# Patient Record
Sex: FEMALE | Race: WHITE | NOT HISPANIC OR LATINO | Employment: UNEMPLOYED | URBAN - METROPOLITAN AREA
[De-identification: names, ages, dates, MRNs, and addresses within clinical notes are randomized per-mention and may not be internally consistent; named-entity substitution may affect disease eponyms.]

---

## 2023-05-16 ENCOUNTER — TELEPHONE (OUTPATIENT)
Dept: PRIMARY CARE | Facility: CLINIC | Age: 33
End: 2023-05-16

## 2023-05-16 DIAGNOSIS — T75.3XXA MOTION SICKNESS, INITIAL ENCOUNTER: Primary | ICD-10-CM

## 2023-05-16 NOTE — TELEPHONE ENCOUNTER
Pt is going to [place] and going [place] sea fishing and she was wondering if there is something she can get for being on a boat. She leaves Sunday morning and uses [employer] [place].

## 2023-05-17 RX ORDER — SCOLOPAMINE TRANSDERMAL SYSTEM 1 MG/1
PATCH, EXTENDED RELEASE TRANSDERMAL
Qty: 2 PATCH | Refills: 0 | Status: SHIPPED | OUTPATIENT
Start: 2023-05-17 | End: 2023-06-26

## 2023-05-17 NOTE — TELEPHONE ENCOUNTER
Rx sent.  Place patch behind ear day before doing fishing and keep on for 3 days then you can remove it.    I am assuming the fishing is a one day thing?

## 2023-06-26 ENCOUNTER — OFFICE VISIT (OUTPATIENT)
Dept: PRIMARY CARE | Facility: CLINIC | Age: 33
End: 2023-06-26
Payer: COMMERCIAL

## 2023-06-26 VITALS
BODY MASS INDEX: 36.28 KG/M2 | DIASTOLIC BLOOD PRESSURE: 66 MMHG | WEIGHT: 218 LBS | SYSTOLIC BLOOD PRESSURE: 107 MMHG | TEMPERATURE: 97.3 F | HEART RATE: 57 BPM

## 2023-06-26 DIAGNOSIS — L23.7 POISON IVY DERMATITIS: Primary | ICD-10-CM

## 2023-06-26 PROCEDURE — 1036F TOBACCO NON-USER: CPT | Performed by: FAMILY MEDICINE

## 2023-06-26 PROCEDURE — 99213 OFFICE O/P EST LOW 20 MIN: CPT | Performed by: FAMILY MEDICINE

## 2023-06-26 RX ORDER — SERTRALINE HYDROCHLORIDE 100 MG/1
100 TABLET, FILM COATED ORAL DAILY
COMMUNITY

## 2023-06-26 RX ORDER — TRIAMCINOLONE ACETONIDE 1 MG/G
CREAM TOPICAL 2 TIMES DAILY
Qty: 30 G | Refills: 0 | Status: SHIPPED | OUTPATIENT
Start: 2023-06-26 | End: 2023-07-28 | Stop reason: ALTCHOICE

## 2023-06-26 RX ORDER — METHYLPREDNISOLONE 4 MG/1
TABLET ORAL
Qty: 21 TABLET | Refills: 0 | Status: SHIPPED | OUTPATIENT
Start: 2023-06-26 | End: 2023-07-03

## 2023-06-26 ASSESSMENT — PATIENT HEALTH QUESTIONNAIRE - PHQ9
2. FEELING DOWN, DEPRESSED OR HOPELESS: NOT AT ALL
SUM OF ALL RESPONSES TO PHQ9 QUESTIONS 1 AND 2: 0
1. LITTLE INTEREST OR PLEASURE IN DOING THINGS: NOT AT ALL

## 2023-06-26 NOTE — PROGRESS NOTES
Subjective   Patient ID: Mary Carmen Jaeger is a 33 y.o. female who presents for Poison Ivy (Pt has poison ivy since last night,).  Yesterday working in yard came in to contact with poison ivy   Today hands hurt and itch and red rash     Poison Ivy  This is a new problem. The current episode started in the past 7 days. The problem is unchanged. The affected locations include the left hand and right hand. The rash is characterized by blistering, itchiness, dryness and redness. She was exposed to plant contact. Pertinent negatives include no anorexia, congestion, cough, diarrhea, eye pain, facial edema, fatigue, fever, joint pain, nail changes, rhinorrhea, shortness of breath, sore throat or vomiting. Past treatments include anti-itch cream. The treatment provided no relief. There is no history of allergies, asthma or varicella.       Review of Systems   Constitutional:  Negative for fatigue and fever.   HENT:  Negative for congestion, rhinorrhea and sore throat.    Eyes:  Negative for pain.   Respiratory:  Negative for cough and shortness of breath.    Gastrointestinal:  Negative for anorexia, diarrhea and vomiting.   Musculoskeletal:  Negative for joint pain.   Skin:  Negative for nail changes.     Constitutional: no chills, no fever and no night sweats.   Eyes: no blurred vision and no eyesight problems.   ENT: no hearing loss, no nasal congestion, no nasal discharge, no hoarseness and no sore throat.   Cardiovascular: no chest pain, no intermittent leg claudication, no lower extremity edema, no palpitations and no syncope.   Respiratory: no cough, no shortness of breath during exertion, no shortness of breath at rest and no wheezing.   Gastrointestinal: no abdominal pain, no blood in stools, no constipation, no diarrhea, no melena, no nausea, no rectal pain and no vomiting.   Genitourinary: no dysuria, no change in urinary frequency, no urinary hesitancy, no feelings of urinary urgency and no vaginal discharge.    Musculoskeletal: no arthralgias,  no back pain and no myalgias.   Integumentary: no new skin lesions and no rashes.   Neurological: no difficulty walking, no headache, no limb weakness, no numbness and no tingling.   Psychiatric: no anxiety, no depression, no anhedonia and no substance use disorders.   Endocrine: no recent weight gain and no recent weight loss.   Hematologic/Lymphatic: no tendency for easy bruising and no swollen glands .    Objective    /66   Pulse 57   Temp 36.3 °C (97.3 °F)   Wt 98.9 kg (218 lb)   BMI 36.28 kg/m²    Physical Exam  The patient appeared well nourished and normally developed. Vital signs as documented. Head exam is unremarkable. No scleral icterus or corneal arcus noted.  Pupils are equal round reactive to light extraocular movements are intact no hemorrhages noted on funduscopic exam mouth mucous membranes are moist no exudates ears canals clear TMs are gray pearly not injected nose no rhinorrhea or epistaxis Neck is without jugular venous distension, thyromegaly, or carotid bruits. Carotid upstrokes are brisk bilaterally. Lungs are clear to auscultation and percussion. Cardiac exam reveals the PMI to be normally sized and situated. Rhythm is regular. First and second heart sounds normal. No murmurs, rubs or gallops. Abdominal exam reveals normal bowel sounds, no masses, no organomegaly and no aortic enlargement. Extremities are nonedematous and both femoral and pedal pulses are normal.  Neurologic exam DTRs are equal bilaterally no focal deficits strength is symmetrical heme lymph no palpable lymph nodes in the neck axilla or groin    Assessment/Plan   Problem List Items Addressed This Visit       Poison ivy dermatitis - Primary    Relevant Medications    methylPREDNISolone (Medrol Dospak) 4 mg tablets    triamcinolone (Kenalog) 0.1 % cream            Saumya Lauren MD

## 2023-06-30 PROBLEM — L23.7 POISON IVY DERMATITIS: Status: ACTIVE | Noted: 2023-06-30

## 2023-06-30 ASSESSMENT — ENCOUNTER SYMPTOMS
COUGH: 0
EYE PAIN: 0
NAIL CHANGES: 0
DIARRHEA: 0
FEVER: 0
ANOREXIA: 0
SORE THROAT: 0
VOMITING: 0
SHORTNESS OF BREATH: 0
FATIGUE: 0
RHINORRHEA: 0

## 2023-07-28 PROBLEM — Z67.20 TYPE B BLOOD, RH POSITIVE: Status: ACTIVE | Noted: 2023-07-28

## 2023-07-28 PROBLEM — M54.9 CHRONIC BACK PAIN: Status: ACTIVE | Noted: 2023-07-28

## 2023-07-28 PROBLEM — M47.816 FACET ARTHROPATHY, LUMBAR: Status: ACTIVE | Noted: 2023-07-28

## 2023-07-28 PROBLEM — F41.9 ANXIETY: Status: ACTIVE | Noted: 2023-07-28

## 2023-07-28 PROBLEM — M41.9 SCOLIOSIS: Status: ACTIVE | Noted: 2023-07-28

## 2023-07-28 PROBLEM — M48.02 FORAMINAL STENOSIS OF CERVICAL REGION: Status: ACTIVE | Noted: 2023-07-28

## 2023-07-28 PROBLEM — F41.1 GAD (GENERALIZED ANXIETY DISORDER): Status: ACTIVE | Noted: 2023-07-28

## 2023-07-28 PROBLEM — M47.812 FACET ARTHROPATHY, CERVICAL: Status: ACTIVE | Noted: 2023-07-28

## 2023-07-28 PROBLEM — L23.7 POISON IVY DERMATITIS: Status: RESOLVED | Noted: 2023-06-30 | Resolved: 2023-07-28

## 2023-07-28 PROBLEM — J45.990 EXERCISE-INDUCED ASTHMA (HHS-HCC): Status: ACTIVE | Noted: 2023-07-28

## 2023-07-28 PROBLEM — Z86.711 HISTORY OF PULMONARY EMBOLISM: Status: ACTIVE | Noted: 2023-07-28

## 2023-07-28 PROBLEM — G89.29 CHRONIC BACK PAIN: Status: ACTIVE | Noted: 2023-07-28

## 2023-07-28 PROBLEM — F41.0 PANIC ATTACKS: Status: ACTIVE | Noted: 2023-07-28

## 2023-07-28 RX ORDER — ALBUTEROL SULFATE 90 UG/1
2 AEROSOL, METERED RESPIRATORY (INHALATION) EVERY 6 HOURS PRN
COMMUNITY

## 2023-07-28 RX ORDER — MULTIVITAMIN
1 TABLET ORAL DAILY
COMMUNITY
End: 2024-01-29 | Stop reason: ALTCHOICE

## 2023-07-28 RX ORDER — FLUTICASONE PROPIONATE AND SALMETEROL 250; 50 UG/1; UG/1
1 POWDER RESPIRATORY (INHALATION)
COMMUNITY
End: 2023-07-31 | Stop reason: ALTCHOICE

## 2023-07-28 ASSESSMENT — ENCOUNTER SYMPTOMS
VOMITING: 0
SORE THROAT: 0
EYE PAIN: 0
POLYPHAGIA: 0
HEMATURIA: 0
HALLUCINATIONS: 0
FREQUENCY: 0
CHILLS: 0
NECK PAIN: 0
DYSURIA: 0
HEADACHES: 0
EYE REDNESS: 0
CONFUSION: 0
BACK PAIN: 0
APPETITE CHANGE: 0
ABDOMINAL PAIN: 0
BRUISES/BLEEDS EASILY: 0
PALPITATIONS: 0
EYE DISCHARGE: 0
TROUBLE SWALLOWING: 0
FATIGUE: 0
ADENOPATHY: 0
FEVER: 0
UNEXPECTED WEIGHT CHANGE: 0
BLOOD IN STOOL: 0
POLYDIPSIA: 0
SHORTNESS OF BREATH: 0
WOUND: 0
COUGH: 0
DIZZINESS: 0

## 2023-07-28 NOTE — PROGRESS NOTES
Subjective   Patient ID: Mary Carmen Jaeger is a 33 y.o. female who presents for Annual Exam (Pt is here for annual exam, would like to know if you can start sertraline /Is pt fasting? no/Does pt see any providers other than care team below: provider list up to date./Mendoza, nnamdi, claude, sofiya/ /Last albuterol inh use; end of June /Using wixela bid consistently? Not using anymore /Which grandma had rectal cancer (maternal or paternal)? Maternal /Phq9, gad7/).  Is pt fasting? no  Does pt see any providers other than care team below: no  Mendoza, nnamdi, claude, sofiya    Last albuterol inh use end of june  Using wixela bid consistently? no  Which grandma had rectal cancer (maternal or paternal)?mat  Phq9=2, gad7=3    No care team member to display    HPI  Last labs-4/2022 cbc nl, bmp neg  Due for labs- all    Cholesterol   Date Value Ref Range Status   06/25/2018 150 0 - 199 mg/dL Final     Comment:     .      AGE      DESIRABLE   BORDERLINE HIGH   HIGH     0-19 Y     0 - 169       170 - 199     >/= 200    20-24 Y     0 - 189       190 - 224     >/= 225         >24 Y     0 - 199       200 - 239     >/= 240   **All ranges are based on fasting samples. Specific   therapeutic targets will vary based on patient-specific   cardiac risk.  .   Pediatric guidelines reference:Pediatrics 2011, 128(S5).   Adult guidelines reference: NCEP ATPIII Guidelines,     URIEL 2001, 258:2486-97  .   Venipuncture immediately after or during the    administration of Metamizole may lead to falsely   low results. Testing should be performed immediately   prior to Metamizole dosing.       Triglycerides   Date Value Ref Range Status   06/25/2018 49 0 - 149 mg/dL Final     Comment:     .      AGE      DESIRABLE   BORDERLINE HIGH   HIGH     VERY HIGH   0 D-90 D    19 - 174         ----         ----        ----  91 D- 9 Y     0 -  74        75 -  99     >/= 100      ----    10-19 Y     0 -  89        90 - 129     >/= 130      ----    20-24 Y      "0 - 114       115 - 149     >/= 150      ----         >24 Y     0 - 149       150 - 199    200- 499    >/= 500  .   Venipuncture immediately after or during the    administration of Metamizole may lead to falsely   low results. Testing should be performed immediately   prior to Metamizole dosing.       HDL   Date Value Ref Range Status   06/25/2018 49.5 mg/dL Final     Comment:     .      AGE      VERY LOW   LOW     NORMAL    HIGH       0-19 Y       < 35   < 40     40-45     ----    20-24 Y       ----   < 40       >45     ----      >24 Y       ----   < 40     40-60      >60  .       No results found for: \"LDL\"  TSH   Date Value Ref Range Status   06/25/2018 1.37 0.44 - 3.98 mIU/L Final     Comment:      TSH testing is performed using different testing    methodology at [facility] than at other    [facility] hospitals. Direct result comparisons should    only be made within the same method.  .   Patients receiving more than 5 mg/day of biotin may have interference   in test results.  A sample should be taken no sooner than eight hours   after  previous dose. Contact [phone] for additional information.       No results found for: \"A1C\"  No components found for: \"POCA1C\"  No results found for: \"ALBUR\"  No components found for: \"POCALBUR\"      Other concerns: wants ddimer checked; had PE due to bcp    Lf thigh with dog bite 1wk ago; just bruised now. No fever or chills, no redness around the area. No open area    bps at home- none    ER/urgicare visits in the last year- rt ankle-July 3-torn ligaments-just switched into ankle brace-still painful. Will be going to PT  Hospitalizations in the last year- none      last Pap- 11/2019, due 11/2024  H/o abn pap-none    FH ovarian, endometrial, cervical, uterine ca-none    Current birth control method-none;  had vasectomy  No change in contraception desired    FH br ca-none    FH colon ca-mgm-rectal    Started emaglutide with b12 injection x 2wks-[facility] [place]    Exercise- none right now due to ankle tear  Diet-lower carbs, decr portions   Body mass index is 35.94 kg/m².    last dental appt- less than 6mon    BMs-regular  Sleep-able to fall asleep and stay asleep; no snoring or apnea  no cp, swelling, sob, abd pain, n/v/d/c, blood in stool or black stools  STI testing including hiv (age 15-65) and hep c screening (18-79)-declines        Immunization History   Administered Date(s) Administered    Pfizer Purple Cap SARS-CoV-2 03/23/2021, 04/15/2021, 12/03/2021    Tdap vaccine, age 10 years and older (BOOSTRIX) 05/15/2019       fractures in lifetime-rt ankle age 13, toes  H/o scoliosis and had back surgery-rods      FH heart attack, heart surgery-none  FH stroke-none    The ASCVD Risk score (Frank KAUR, et al., 2019) failed to calculate for the following reasons:    The 2019 ASCVD risk score is only valid for ages 40 to 79        Review of Systems   Constitutional:  Negative for appetite change, chills, fatigue, fever and unexpected weight change.   HENT:  Negative for congestion, ear pain, sore throat and trouble swallowing.    Eyes:  Negative for pain, discharge and redness.   Respiratory:  Negative for cough and shortness of breath.    Cardiovascular:  Negative for chest pain and palpitations.   Gastrointestinal:  Negative for abdominal pain, blood in stool and vomiting.   Endocrine: Negative for polydipsia, polyphagia and polyuria.   Genitourinary:  Negative for dysuria, frequency, hematuria and urgency.   Musculoskeletal:  Negative for back pain and neck pain.   Skin:  Negative for rash and wound.   Allergic/Immunologic: Negative for immunocompromised state.   Neurological:  Negative for dizziness, syncope and headaches.   Hematological:  Negative for adenopathy. Does not bruise/bleed easily.   Psychiatric/Behavioral:  Negative for confusion and hallucinations.        Objective   Visit Vitals  /67   Pulse 78   Temp 36.6 °C (97.8 °F)       BP Readings from Last 3 Encounters:   07/31/23 100/67   06/26/23 107/66   01/16/23 116/70     Wt Readings from Last 3 Encounters:   07/31/23 98 kg (216 lb)   06/26/23 98.9 kg (218 lb)   01/16/23 96.6 kg (213 lb)           Physical Exam  Constitutional:       General: She is not in acute distress.     Appearance: Normal appearance. She is not ill-appearing.   HENT:      Head: Normocephalic.      Right Ear: Tympanic membrane, ear canal and external ear normal.      Left Ear: Tympanic membrane, ear canal and external ear normal.      Nose: Nose normal.      Mouth/Throat:      Mouth: Mucous membranes are moist.      Pharynx: Oropharynx is clear.   Eyes:      Extraocular Movements: Extraocular movements intact.      Conjunctiva/sclera: Conjunctivae normal.      Pupils: Pupils are equal, round, and reactive to light.   Cardiovascular:      Rate and Rhythm: Normal rate and regular rhythm.      Heart sounds: Normal heart sounds. No murmur heard.  Pulmonary:      Effort: Pulmonary effort is normal. No respiratory distress.      Breath sounds: Normal breath sounds. No wheezing, rhonchi or rales.   Abdominal:      General: Bowel sounds are normal.      Palpations: Abdomen is soft. There is no mass.      Tenderness: There is no abdominal tenderness.   Musculoskeletal:         General: No swelling or tenderness. Normal range of motion.      Cervical back: Normal range of motion and neck supple.      Right lower leg: No edema.      Left lower leg: No edema.   Skin:     General: Skin is warm.      Findings: No rash.   Neurological:      General: No focal deficit present.      Mental Status: She is alert and oriented to person, place, and time.      Cranial Nerves: No cranial nerve deficit.      Motor: No weakness.   Psychiatric:         Mood and Affect: Mood normal.         Behavior: Behavior normal.         Assessment/Plan   Diagnoses and all orders for this visit:  Routine general medical examination at a health care  facility  -     Comprehensive Metabolic Panel; Future  -     CBC and Auto Differential; Future  -     Lipid Panel; Future  -     TSH with reflex to Free T4 if abnormal; Future  BMI 35.0-35.9,adult  History of pulmonary embolism  -     D-dimer, quantitative; Future  Other orders  -     Follow Up In Primary Care - Established; Future

## 2023-07-31 ENCOUNTER — OFFICE VISIT (OUTPATIENT)
Dept: PRIMARY CARE | Facility: CLINIC | Age: 33
End: 2023-07-31
Payer: COMMERCIAL

## 2023-07-31 VITALS
TEMPERATURE: 97.8 F | HEART RATE: 78 BPM | OXYGEN SATURATION: 95 % | BODY MASS INDEX: 35.99 KG/M2 | SYSTOLIC BLOOD PRESSURE: 100 MMHG | DIASTOLIC BLOOD PRESSURE: 67 MMHG | WEIGHT: 216 LBS | HEIGHT: 65 IN

## 2023-07-31 DIAGNOSIS — Z00.00 ROUTINE GENERAL MEDICAL EXAMINATION AT A HEALTH CARE FACILITY: Primary | ICD-10-CM

## 2023-07-31 DIAGNOSIS — Z86.711 HISTORY OF PULMONARY EMBOLISM: ICD-10-CM

## 2023-07-31 PROCEDURE — 3008F BODY MASS INDEX DOCD: CPT | Performed by: NURSE PRACTITIONER

## 2023-07-31 PROCEDURE — 1036F TOBACCO NON-USER: CPT | Performed by: NURSE PRACTITIONER

## 2023-07-31 PROCEDURE — 99395 PREV VISIT EST AGE 18-39: CPT | Performed by: NURSE PRACTITIONER

## 2023-07-31 ASSESSMENT — ANXIETY QUESTIONNAIRES
3. WORRYING TOO MUCH ABOUT DIFFERENT THINGS: NOT AT ALL
5. BEING SO RESTLESS THAT IT IS HARD TO SIT STILL: NOT AT ALL
6. BECOMING EASILY ANNOYED OR IRRITABLE: SEVERAL DAYS
7. FEELING AFRAID AS IF SOMETHING AWFUL MIGHT HAPPEN: NOT AT ALL
IF YOU CHECKED OFF ANY PROBLEMS ON THIS QUESTIONNAIRE, HOW DIFFICULT HAVE THESE PROBLEMS MADE IT FOR YOU TO DO YOUR WORK, TAKE CARE OF THINGS AT HOME, OR GET ALONG WITH OTHER PEOPLE: SOMEWHAT DIFFICULT
1. FEELING NERVOUS, ANXIOUS, OR ON EDGE: SEVERAL DAYS
GAD7 TOTAL SCORE: 3
2. NOT BEING ABLE TO STOP OR CONTROL WORRYING: NOT AT ALL
4. TROUBLE RELAXING: SEVERAL DAYS

## 2023-07-31 ASSESSMENT — PATIENT HEALTH QUESTIONNAIRE - PHQ9
1. LITTLE INTEREST OR PLEASURE IN DOING THINGS: NOT AT ALL
8. MOVING OR SPEAKING SO SLOWLY THAT OTHER PEOPLE COULD HAVE NOTICED. OR THE OPPOSITE, BEING SO FIGETY OR RESTLESS THAT YOU HAVE BEEN MOVING AROUND A LOT MORE THAN USUAL: NOT AT ALL
2. FEELING DOWN, DEPRESSED OR HOPELESS: NOT AT ALL
3. TROUBLE FALLING OR STAYING ASLEEP OR SLEEPING TOO MUCH: SEVERAL DAYS
2. FEELING DOWN, DEPRESSED OR HOPELESS: NOT AT ALL
SUM OF ALL RESPONSES TO PHQ QUESTIONS 1-9: 2
9. THOUGHTS THAT YOU WOULD BE BETTER OFF DEAD, OR OF HURTING YOURSELF: NOT AT ALL
SUM OF ALL RESPONSES TO PHQ9 QUESTIONS 1 AND 2: 0
1. LITTLE INTEREST OR PLEASURE IN DOING THINGS: NOT AT ALL
7. TROUBLE CONCENTRATING ON THINGS, SUCH AS READING THE NEWSPAPER OR WATCHING TELEVISION: NOT AT ALL
10. IF YOU CHECKED OFF ANY PROBLEMS, HOW DIFFICULT HAVE THESE PROBLEMS MADE IT FOR YOU TO DO YOUR WORK, TAKE CARE OF THINGS AT HOME, OR GET ALONG WITH OTHER PEOPLE: NOT DIFFICULT AT ALL
6. FEELING BAD ABOUT YOURSELF - OR THAT YOU ARE A FAILURE OR HAVE LET YOURSELF OR YOUR FAMILY DOWN: SEVERAL DAYS
SUM OF ALL RESPONSES TO PHQ9 QUESTIONS 1 AND 2: 0
5. POOR APPETITE OR OVEREATING: NOT AT ALL
4. FEELING TIRED OR HAVING LITTLE ENERGY: NOT AT ALL

## 2023-07-31 NOTE — PATIENT INSTRUCTIONS
Handouts given to pt:  physical handout        I recommend signing up for MyChart.    Labs:    You can use the lab in our building when fasting. The hrs are: Monday-Thursday, 7 a.m. - 6 p.m., Friday, 7 a.m. - 5 p.m., Saturday 8 a.m. - 12 noon.   No appt needed, BUT YOU DO NEED THE PAPER ORDER.    Fasting is no food, drink, gum or mints other than water for 12 hrs.   Results will be back in 2-3 business days for most labs. It is always recommended for any orders (labs, xrays, ultrasounds,MRI, ct scan, procedures etc) to check with your insurance provider for expected costs or expenses to you.     You will get your results via phone from my medical assistant if you do not have MyChart.  OR  You will get your results via [employer]    If a result is urgent, I will call to speak to you.    Vaccines:  Up to date    General recommendations:  Exercise-cardio 4-5d/wk 30min each day  Diet-Breakfast-toast (my favorite Elva Osuna Delighful Multigrain or Akhil's Killer Bread Good Seed thin-sliced)/bagel/English muffin-whole wheat flour as a 1st ingredient or cereal/oatmeal/granola bar-fiber 4g or more or protein like eggs or peanut butter; optional veggies  Lunch-protein, 1/2c carb or 2 slices bread, veg 1c  Dinner-protein, fist sized carb, veg 1c  Fruit 2 a day  Dairy 2 a day-milk, soy milk, almond milk, cheese, yogurt, cottage cheese  Snacks-Protein-hard boiled egg, nuts (walnuts/almonds/pecans/pistachios 1/4c), hummus, beef/deer jerky or meat sticks; vegetable, fruit, dairy-milk(1%, skim, almond, soy)/cheese (not a lot of cheddar)/yogurt (Greek is best-my favorite Dannon Fruit on the Bottom Greek)/cottage cheese 2%; triscuits/ popcorn/wheat thins have a lot of fiber; follow serving size on bag/box/container  increase water  Limit alcohol to 1 drink per day for women and 2 drinks per day for men (1 drink=12oz beer or 5oz wine or 1 1/2oz liquor)  Calcium: 500mg 1 twice a day if age 50 and younger and 600mg 1 twice a day if over  age 50 (calcium citrate can be taken without food)  Vitamin D: 800-5000 IU/day  Limit salt to <2300mg a day if age 50 and under and <1500mg a day if over age 50/have high bp or diabetes or kidney disease  Recommend folate for childbearing age women 0.4mg per day (can be found in a multivitamin)  Recommend 18mg/dL of iron a day if age 50 and under and 8mg/dL a day if over age 50; take on an empty stomach at bedtime  Use sunscreen   Wear seatbelt  Recommend safe sex practices: using condoms everytime you have sex, discuss with a new partner about their past partners/history of STDs/drug use, avoid drinking alcohol or using drugs as this increases the chance that you will participate in high-risk sex, for oral sex help protect your mouth by having your partner use a condom (male or female), women should not douche after sex, be aware of your partner's body and your body-look for signs of a sore, blister, rash, or discharge, and have regular exams and periodic tests for STDs.  No distracted driving  No driving when under influence of substances  Wear a seatbelt  Eye dr every 1-2yrs  Dentist every 6-12 mon  Tetanus shot every 10yrs  Recommend flu vaccine in the fall  Appt in 6mon for follow up on diet exercise and anxiety and 1 year for physical      I will communicate with you via [employer] regarding messages and results. If you need help with this, you can call the support line at [phone].    IT WAS A PLEASURE TO SEE YOU TODAY. THANK YOU FOR CHOOSING US FOR YOUR HEALTHCARE NEEDS.

## 2023-10-14 RX ORDER — CLONIDINE HYDROCHLORIDE 0.1 MG/1
TABLET ORAL
COMMUNITY
Start: 2020-11-04 | End: 2024-01-29 | Stop reason: ALTCHOICE

## 2023-10-14 RX ORDER — BACLOFEN 20 MG/1
20 TABLET ORAL 3 TIMES DAILY
COMMUNITY
Start: 2020-11-03 | End: 2024-01-29 | Stop reason: ALTCHOICE

## 2023-10-14 RX ORDER — DIAZEPAM 5 MG/1
5 TABLET ORAL EVERY 6 HOURS
COMMUNITY
Start: 2020-07-24 | End: 2024-01-29 | Stop reason: ALTCHOICE

## 2023-10-14 RX ORDER — BACLOFEN 10 MG/1
TABLET ORAL
COMMUNITY
Start: 2020-02-06 | End: 2024-01-29 | Stop reason: ALTCHOICE

## 2023-10-14 RX ORDER — BACLOFEN 5 MG/1
15 TABLET ORAL DAILY
COMMUNITY
End: 2024-01-29 | Stop reason: ALTCHOICE

## 2023-10-14 RX ORDER — ONDANSETRON 4 MG/1
4 TABLET, ORALLY DISINTEGRATING ORAL EVERY 8 HOURS PRN
COMMUNITY
Start: 2023-01-23 | End: 2024-01-29 | Stop reason: ALTCHOICE

## 2023-10-14 RX ORDER — POLYETHYLENE GLYCOL 3350 17 G/17G
17 POWDER, FOR SOLUTION ORAL ONCE
COMMUNITY
Start: 2020-07-24 | End: 2024-01-29 | Stop reason: ALTCHOICE

## 2023-10-14 RX ORDER — OXYCODONE HYDROCHLORIDE 5 MG/1
1-2 TABLET ORAL EVERY 6 HOURS
COMMUNITY
Start: 2020-07-24 | End: 2024-01-29 | Stop reason: ALTCHOICE

## 2023-10-14 RX ORDER — PREDNISONE 20 MG/1
20 TABLET ORAL DAILY
COMMUNITY
Start: 2023-01-26 | End: 2024-01-29 | Stop reason: ALTCHOICE

## 2023-10-14 RX ORDER — DOXYCYCLINE HYCLATE 100 MG
TABLET ORAL
COMMUNITY
Start: 2018-08-28 | End: 2024-01-27 | Stop reason: ALTCHOICE

## 2023-10-14 RX ORDER — FLUTICASONE PROPIONATE AND SALMETEROL 250; 50 UG/1; UG/1
1 POWDER RESPIRATORY (INHALATION)
COMMUNITY
Start: 2023-01-26

## 2023-10-14 RX ORDER — ONDANSETRON 4 MG/1
4 TABLET, FILM COATED ORAL EVERY 8 HOURS
COMMUNITY
Start: 2020-07-24 | End: 2024-01-29 | Stop reason: ALTCHOICE

## 2023-10-14 RX ORDER — NAPROXEN 500 MG/1
500 TABLET ORAL 2 TIMES DAILY
COMMUNITY
Start: 2020-07-24 | End: 2024-01-29 | Stop reason: ALTCHOICE

## 2023-10-14 RX ORDER — IBUPROFEN 600 MG/1
600 TABLET ORAL EVERY 6 HOURS PRN
COMMUNITY
Start: 2023-07-03 | End: 2024-01-29 | Stop reason: ALTCHOICE

## 2023-10-14 RX ORDER — ACETAMINOPHEN 500 MG
500 TABLET ORAL AS NEEDED
COMMUNITY

## 2023-10-14 RX ORDER — ACETAMINOPHEN 325 MG/1
650 TABLET ORAL EVERY 4 HOURS PRN
COMMUNITY
Start: 2021-06-30 | End: 2024-01-29 | Stop reason: ALTCHOICE

## 2023-10-14 RX ORDER — GABAPENTIN 600 MG/1
600 TABLET ORAL 3 TIMES DAILY
COMMUNITY
End: 2024-01-29 | Stop reason: ALTCHOICE

## 2023-10-14 RX ORDER — CYANOCOBALAMIN 1000 UG/ML
1000 INJECTION, SOLUTION INTRAMUSCULAR; SUBCUTANEOUS
COMMUNITY
Start: 2023-06-22 | End: 2024-01-29 | Stop reason: ALTCHOICE

## 2023-10-14 RX ORDER — FLUTICASONE PROPIONATE 50 MCG
1 SPRAY, SUSPENSION (ML) NASAL 2 TIMES DAILY
COMMUNITY
Start: 2021-10-25 | End: 2024-01-29 | Stop reason: ALTCHOICE

## 2023-10-15 PROBLEM — M50.123 CERVICAL DISC DISORDER AT C6-C7 LEVEL WITH RADICULOPATHY: Status: ACTIVE | Noted: 2023-10-15

## 2023-10-15 PROBLEM — J45.20 MILD INTERMITTENT ASTHMA WITHOUT COMPLICATION (HHS-HCC): Status: ACTIVE | Noted: 2023-10-15

## 2023-10-15 PROBLEM — L72.3 SEBACEOUS CYST: Status: ACTIVE | Noted: 2018-09-11

## 2023-10-15 PROBLEM — M25.373 ANKLE INSTABILITY: Status: ACTIVE | Noted: 2023-10-15

## 2023-10-15 PROBLEM — Z98.891 STATUS POST CESAREAN SECTION: Status: ACTIVE | Noted: 2023-10-15

## 2023-10-15 PROBLEM — S99.919A ANKLE INJURY: Status: ACTIVE | Noted: 2023-10-15

## 2023-10-15 PROBLEM — J40 BRONCHITIS: Status: ACTIVE | Noted: 2023-10-15

## 2023-10-15 PROBLEM — J32.1 CHRONIC FRONTAL SINUSITIS: Status: ACTIVE | Noted: 2023-10-15

## 2023-10-17 ENCOUNTER — OFFICE VISIT (OUTPATIENT)
Dept: ORTHOPEDIC SURGERY | Facility: CLINIC | Age: 33
End: 2023-10-17
Payer: COMMERCIAL

## 2023-10-17 DIAGNOSIS — S93.401A MODERATE RIGHT ANKLE SPRAIN, INITIAL ENCOUNTER: Primary | ICD-10-CM

## 2023-10-17 PROCEDURE — 99212 OFFICE O/P EST SF 10 MIN: CPT | Performed by: INTERNAL MEDICINE

## 2023-10-17 PROCEDURE — 3008F BODY MASS INDEX DOCD: CPT | Performed by: INTERNAL MEDICINE

## 2023-10-17 PROCEDURE — 1036F TOBACCO NON-USER: CPT | Performed by: INTERNAL MEDICINE

## 2023-10-17 ASSESSMENT — PATIENT HEALTH QUESTIONNAIRE - PHQ9
SUM OF ALL RESPONSES TO PHQ9 QUESTIONS 1 AND 2: 0
1. LITTLE INTEREST OR PLEASURE IN DOING THINGS: NOT AT ALL
2. FEELING DOWN, DEPRESSED OR HOPELESS: NOT AT ALL

## 2023-10-17 NOTE — PROGRESS NOTES
CC:   Chief Complaint   Patient presents with    Right Ankle - Follow-up, Pain      FUV- Right ankle sprain, grade 2-3.  DOI- 7/3/23   more pain and it is starting to radiate with some swelling   S/P- PT       HPI: Mary Carmen is a 33 y.o. female follow-up for right ankle injury.  She is doing some pain and discomfort and instability.  She has been a fracture boot and lace up ankle brace.  She is done physical therapy.  Anti-inflammatories.  Continues to have pain and discomfort with no improvement.        Review of Systems   GENERAL: Negative for malaise, significant weight loss, fever  MUSCULOSKELETAL: See HPI  NEURO:  Negative for numbness / tingling     Past Medical History  Past Medical History:   Diagnosis Date    COVID-19 07/2022    Other conditions influencing health status 08/29/2019    No significant past medical history    Personal history of other complications of pregnancy, childbirth and the puerperium     History of pre-eclampsia    Personal history of other diseases of the respiratory system 03/21/2022    History of acute bacterial sinusitis    Personal history of pulmonary embolism 04/08/2022    History of pulmonary embolism    Unspecified asthma, uncomplicated     Mild asthma without complication, unspecified whether persistent       Medication review  Medication Documentation Review Audit       Reviewed by Kiana Zimmerman MA (Medical Assistant) on 10/17/23 at 1302      Medication Order Taking? Sig Documenting Provider Last Dose Status   acetaminophen (Tylenol) 325 mg tablet [id number]  Take 2 tablets (650 mg) by mouth every 4 hours if needed. Historical Provider, MD  Active   acetaminophen (Tylenol) 500 mg tablet [id number]  Take 1 tablet (500 mg) by mouth if needed. Historical Provider, MD  Active   albuterol 90 mcg/actuation inhaler [id number] No Inhale 2 puffs every 6 hours if needed for wheezing. Historical Provider, MD Taking Active   apixaban (Eliquis) 5 mg tablet [id number]  Take 1 tablet (5 mg)  by mouth 2 times a day. Douglas Rollins MD  Active   baclofen (Lioresal) 10 mg tablet [id number]   Douglas Rollins MD  Active   baclofen (Lioresal) 20 mg tablet [id number]  Take 1 tablet (20 mg) by mouth 3 times a day. Douglas Rollins MD  Active   baclofen (Lioresal) 5 mg tablet [id number]  Take 3 tablets (15 mg) by mouth once daily. Douglas Rollins MD  Active   buspirone HCl (BUSPIRONE ORAL) [id number]  Take 10 mg by mouth once daily. Douglas Rollins MD  Active   cloNIDine (Catapres) 0.1 mg tablet [id number]  Take by mouth. Douglas Rollins MD  Active   cyanocobalamin (Vitamin B-12) 1,000 mcg/mL injection [id number]  Inject 1 mL (1,000 mcg) into the muscle 1 (one) time per week. Douglas Rollins MD  Active   diazePAM (Valium) 5 mg tablet [id number]  Take 1 tablet (5 mg) by mouth every 6 hours. Douglas Rollins MD  Active   doxycycline (Vibra-Tabs) 100 mg tablet [id number]  1 Tablet Historical MD Ria  Active   fluticasone (Flonase) 50 mcg/actuation nasal spray [id number]  Administer 1 spray into each nostril 2 times a day. Douglas Rollins MD  Active   fluticasone propion-salmeteroL (Wixela Inhub) 250-50 mcg/dose diskus inhaler [id number]  Inhale 1 puff 2 times a day. Douglas Rollins MD  Active   gabapentin (Neurontin) 600 mg tablet [id number]  Take 1 tablet (600 mg) by mouth 3 times a day. Douglas Rollins MD  Active    mg tablet [id number]  Take 1 tablet (600 mg) by mouth every 6 hours if needed. Douglas Rollins MD  Active   multivitamin tablet [id number] No Take 1 tablet by mouth once daily. Douglas Rollins MD Taking Active   multivitamin with minerals (MULTIPLE VITAMIN-MINERALS ORAL) [id number]  Take by mouth. Douglas Rollins MD  Active   naproxen (Naprosyn) 500 mg tablet [id number]  Take 1 tablet (500 mg) by mouth twice a day. Douglas Rollins MD  Active   ondansetron (Zofran) 4 mg tablet [id number]  Take 1 tablet (4 mg) by mouth every 8 hours.  Historical Provider, MD  Active   ondansetron ODT (Zofran-ODT) 4 mg disintegrating tablet [id number]  Take 1 tablet (4 mg) by mouth every 8 hours if needed. Historical Provider, MD  Active   oxyCODONE (Roxicodone) 5 mg immediate release tablet [id number]  Take 1-2 tablets (5-10 mg) by mouth every 6 hours. Historical Provider, MD  Active   polyethylene glycol (Miralax) 17 gram/dose powder [id number]  Take 17 g by mouth 1 time. Historical Provider, MD  Active   predniSONE (Deltasone) 20 mg tablet [id number]  Take 1 tablet (20 mg) by mouth once daily. Historical Provider, MD  Active   semaglutide 0.25 mg or 0.5 mg (2 mg/3 mL) pen injector [id number]  Inject 0.5 mg under the skin every 7 days. Historical Provider, MD  Active   sertraline (Zoloft) 100 mg tablet [id number] No Take 1 tablet (100 mg) by mouth once daily. Historical Provider, MD Taking Active                    Allergies  Allergies   Allergen Reactions    Codeine Anaphylaxis, Diarrhea, Nausea/vomiting, Nausea Only and Shortness of breath       Social History  Social History     Socioeconomic History    Marital status:      Spouse name: Not on file    Number of children: Not on file    Years of education: Not on file    Highest education level: Not on file   Occupational History    Not on file   Tobacco Use    Smoking status: Never    Smokeless tobacco: Never   Substance and Sexual Activity    Alcohol use: Yes    Drug use: Never    Sexual activity: Not on file   Other Topics Concern    Not on file   Social History Narrative    Not on file     Social Determinants of Health     Financial Resource Strain: Not on file   Food Insecurity: Not on file   Transportation Needs: Not on file   Physical Activity: Not on file   Stress: Not on file   Social Connections: Not on file   Intimate Partner Violence: Not on file   Housing Stability: Not on file       Surgical History  Past Surgical History:   Procedure Laterality Date    KNEE ARTHROSCOPY W/ DEBRIDEMENT  08/29/2019     Arthroscopy Knee Right    KNEE ARTHROSCOPY W/ DEBRIDEMENT  2019    Arthroscopy Knee Left    OTHER SURGICAL HISTORY  2022    Corneal lasik    OTHER SURGICAL HISTORY  2022    Back surgery    OTHER SURGICAL HISTORY  2022     section    OTHER SURGICAL HISTORY  2022    Dilation and curettage       Physical Exam:  GENERAL:  Patient is awake, alert, and oriented to person place and time.  Patient appears well nourished and well kept.  Affect Calm, Not Acutely Distressed.  HEENT:  Normocephalic, Atraumatic, EOMI  CARDIOVASCULAR:  Hemodynamically stable.  RESPIRATORY:  Normal respirations with unlabored breathing.  Extremity: Right foot ankle shows skin is intact.  No obvious deformity.  There is no pain of the lateral or medial malleolus.  Some mild pain over the deltoid ligament.  Some mild pain over the PTF ligament.  Minimal pain of the ATF ligament.  Negative Carnes's test.  Negative anterior drawer test.  No pain of the talar dome.  She is neurovascular intact.      Diagnostics: Trays reviewed  XR ankle  Interpreted By:  MORGAN ENGLISH MD  MRN: [id number]  Patient Name: MARY CARMEN GUERRIER     STUDY:  ANKLE, COMPLETE, MIN 3 VIEWS;  Right;  2023 1:44 pm     INDICATION:  pain  S99.919A: Ankle injury.     ACCESSION NUMBER(S):  [id number]     ORDERING CLINICIAN:  MORGAN ENGLISH     FINDINGS:  Right ankle x-rays three views AP, lateral and oblique view: No acute  fractures, no dislocation. Mortise intact.         Procedure: None    Assessment: 1.  Right ankle sprain  2.  Right ankle instability    Plan: Mary Carmen presents for follow-up for right ankle sprain and ankle instability.  She has failed all conservative treatment options with immobilization, fracture boot, lace up ankle brace and physical therapy.  We will request for authorization for MRI of the right ankle to evaluate for ankle instability.  She will follow-up after the MRI and discuss treatment options pending MRI  results.    No orders of the defined types were placed in this encounter.     At the conclusion of the visit there were no further questions by the patient/family regarding their plan of care.  Patient was instructed to call or return with any issues, questions, or concerns regarding their injury and/or treatment plan described above.     10/17/23 at 1:11 PM - Clotilde Mendoza MD    Office: [phone]    This note was prepared using voice recognition software.  The details of this note are correct and have been reviewed, and corrected to the best of my ability.  Some grammatical errors may persist related to the Dragon software.

## 2023-10-26 ENCOUNTER — TELEPHONE (OUTPATIENT)
Dept: PRIMARY CARE | Facility: CLINIC | Age: 33
End: 2023-10-26
Payer: COMMERCIAL

## 2023-11-01 ENCOUNTER — HOSPITAL ENCOUNTER (OUTPATIENT)
Dept: RADIOLOGY | Facility: HOSPITAL | Age: 33
Discharge: HOME | End: 2023-11-01
Payer: COMMERCIAL

## 2023-11-01 DIAGNOSIS — S93.401A MODERATE RIGHT ANKLE SPRAIN, INITIAL ENCOUNTER: ICD-10-CM

## 2023-11-01 PROCEDURE — 73721 MRI JNT OF LWR EXTRE W/O DYE: CPT | Mod: RIGHT SIDE | Performed by: STUDENT IN AN ORGANIZED HEALTH CARE EDUCATION/TRAINING PROGRAM

## 2023-11-01 PROCEDURE — 73721 MRI JNT OF LWR EXTRE W/O DYE: CPT | Mod: RT

## 2023-11-07 ENCOUNTER — OFFICE VISIT (OUTPATIENT)
Dept: ORTHOPEDIC SURGERY | Facility: CLINIC | Age: 33
End: 2023-11-07
Payer: COMMERCIAL

## 2023-11-07 DIAGNOSIS — S93.401A MODERATE RIGHT ANKLE SPRAIN, INITIAL ENCOUNTER: Primary | ICD-10-CM

## 2023-11-07 PROCEDURE — 99213 OFFICE O/P EST LOW 20 MIN: CPT | Performed by: INTERNAL MEDICINE

## 2023-11-07 PROCEDURE — 1036F TOBACCO NON-USER: CPT | Performed by: INTERNAL MEDICINE

## 2023-11-07 PROCEDURE — 3008F BODY MASS INDEX DOCD: CPT | Performed by: INTERNAL MEDICINE

## 2023-11-07 ASSESSMENT — PAIN - FUNCTIONAL ASSESSMENT: PAIN_FUNCTIONAL_ASSESSMENT: 0-10

## 2023-11-07 ASSESSMENT — PAIN SCALES - GENERAL
PAINLEVEL_OUTOF10: 2
PAINLEVEL_OUTOF10: 2

## 2023-11-07 NOTE — PROGRESS NOTES
CC:   Chief Complaint   Patient presents with    Right Ankle - Follow-up     MRI Review  Sprain and instability   DOI:7/3/23       HPI: Mary Carmen is a 33 y.o. female presented for an MRI of the right ankle, and making slow progress to having some pain.  She almost now about 4 months now.  She is here for MRI follow-up.        Review of Systems   GENERAL: Negative for malaise, significant weight loss, fever  MUSCULOSKELETAL: See HPI  NEURO:  Negative for numbness / tingling     Past Medical History  Past Medical History:   Diagnosis Date    COVID-19 07/2022    Other conditions influencing health status 08/29/2019    No significant past medical history    Personal history of other complications of pregnancy, childbirth and the puerperium     History of pre-eclampsia    Personal history of other diseases of the respiratory system 03/21/2022    History of acute bacterial sinusitis    Personal history of pulmonary embolism 04/08/2022    History of pulmonary embolism    Unspecified asthma, uncomplicated     Mild asthma without complication, unspecified whether persistent       Medication review  Medication Documentation Review Audit       Reviewed by Kiana Zimmerman MA (Medical Assistant) on 10/17/23 at 1302      Medication Order Taking? Sig Documenting Provider Last Dose Status   acetaminophen (Tylenol) 325 mg tablet [id number]  Take 2 tablets (650 mg) by mouth every 4 hours if needed. Historical Provider, MD  Active   acetaminophen (Tylenol) 500 mg tablet [id number]  Take 1 tablet (500 mg) by mouth if needed. Historical Provider, MD  Active   albuterol 90 mcg/actuation inhaler [id number] No Inhale 2 puffs every 6 hours if needed for wheezing. Historical Provider, MD Taking Active   apixaban (Eliquis) 5 mg tablet [id number]  Take 1 tablet (5 mg) by mouth 2 times a day. Historical Provider, MD  Active   baclofen (Lioresal) 10 mg tablet [id number]   Historical Provider, MD  Active   baclofen (Lioresal) 20 mg tablet [id number]   Take 1 tablet (20 mg) by mouth 3 times a day. Douglas Rollins MD  Active   baclofen (Lioresal) 5 mg tablet [id number]  Take 3 tablets (15 mg) by mouth once daily. Douglas Rollins MD  Active   buspirone HCl (BUSPIRONE ORAL) [id number]  Take 10 mg by mouth once daily. Douglas Rollins MD  Active   cloNIDine (Catapres) 0.1 mg tablet [id number]  Take by mouth. Douglas Rollins MD  Active   cyanocobalamin (Vitamin B-12) 1,000 mcg/mL injection [id number]  Inject 1 mL (1,000 mcg) into the muscle 1 (one) time per week. Douglas Rollins MD  Active   diazePAM (Valium) 5 mg tablet [id number]  Take 1 tablet (5 mg) by mouth every 6 hours. Douglas Rollins MD  Active   doxycycline (Vibra-Tabs) 100 mg tablet [id number]  1 Tablet Historical MD Ria  Active   fluticasone (Flonase) 50 mcg/actuation nasal spray [id number]  Administer 1 spray into each nostril 2 times a day. Douglas Rollins MD  Active   fluticasone propion-salmeteroL (Wixela Inhub) 250-50 mcg/dose diskus inhaler [id number]  Inhale 1 puff 2 times a day. Douglas Rollins MD  Active   gabapentin (Neurontin) 600 mg tablet [id number]  Take 1 tablet (600 mg) by mouth 3 times a day. Douglas Rollins MD  Active    mg tablet [id number]  Take 1 tablet (600 mg) by mouth every 6 hours if needed. Douglas Rollins MD  Active   multivitamin tablet [id number] No Take 1 tablet by mouth once daily. Douglas Rollins MD Taking Active   multivitamin with minerals (MULTIPLE VITAMIN-MINERALS ORAL) [id number]  Take by mouth. Douglas Rollins MD  Active   naproxen (Naprosyn) 500 mg tablet [id number]  Take 1 tablet (500 mg) by mouth twice a day. Douglas Rollins MD  Active   ondansetron (Zofran) 4 mg tablet [id number]  Take 1 tablet (4 mg) by mouth every 8 hours. Douglas Rollins MD  Active   ondansetron ODT (Zofran-ODT) 4 mg disintegrating tablet [id number]  Take 1 tablet (4 mg) by mouth every 8 hours if needed. Douglas Rollins MD   Active   oxyCODONE (Roxicodone) 5 mg immediate release tablet [id number]  Take 1-2 tablets (5-10 mg) by mouth every 6 hours. Historical Provider, MD  Active   polyethylene glycol (Miralax) 17 gram/dose powder [id number]  Take 17 g by mouth 1 time. Historical Provider, MD  Active   predniSONE (Deltasone) 20 mg tablet [id number]  Take 1 tablet (20 mg) by mouth once daily. Historical Provider, MD  Active   semaglutide 0.25 mg or 0.5 mg (2 mg/3 mL) pen injector [id number]  Inject 0.5 mg under the skin every 7 days. Historical Provider, MD  Active   sertraline (Zoloft) 100 mg tablet [id number] No Take 1 tablet (100 mg) by mouth once daily. Historical Provider, MD Taking Active                    Allergies  Allergies   Allergen Reactions    Codeine Anaphylaxis, Diarrhea, Nausea/vomiting, Nausea Only and Shortness of breath       Social History  Social History     Socioeconomic History    Marital status:      Spouse name: Not on file    Number of children: Not on file    Years of education: Not on file    Highest education level: Not on file   Occupational History    Not on file   Tobacco Use    Smoking status: Never    Smokeless tobacco: Never   Substance and Sexual Activity    Alcohol use: Yes    Drug use: Never    Sexual activity: Not on file   Other Topics Concern    Not on file   Social History Narrative    Not on file     Social Determinants of Health     Financial Resource Strain: Not on file   Food Insecurity: Not on file   Transportation Needs: Not on file   Physical Activity: Not on file   Stress: Not on file   Social Connections: Not on file   Intimate Partner Violence: Not on file   Housing Stability: Not on file       Surgical History  Past Surgical History:   Procedure Laterality Date    KNEE ARTHROSCOPY W/ DEBRIDEMENT  08/29/2019    Arthroscopy Knee Right    KNEE ARTHROSCOPY W/ DEBRIDEMENT  08/29/2019    Arthroscopy Knee Left    OTHER SURGICAL HISTORY  07/18/2022    Corneal lasik    OTHER SURGICAL HISTORY   2022    Back surgery    OTHER SURGICAL HISTORY  2022     section    OTHER SURGICAL HISTORY  2022    Dilation and curettage       Physical Exam:  GENERAL:  Patient is awake, alert, and oriented to person place and time.  Patient appears well nourished and well kept.  Affect Calm, Not Acutely Distressed.  HEENT:  Normocephalic, Atraumatic, EOMI  CARDIOVASCULAR:  Hemodynamically stable.  RESPIRATORY:  Normal respirations with unlabored breathing.  Extremity: Right foot and ankle shows skin is intact.  No obvious swelling deformity.  There is no pain over the lateral malleolus, minimal pain over the medial malleus.  Minimal pain with ATF ligament there is no pain at the CF or PTF ligament.  Minimal pain over the deltoid ligament.  Negative Carnes's test.  She is neurovascular intact.      Diagnostics: Reviewed  MR ankle right wo IV contrast  Narrative: Interpreted By:  Brayan Vogel,   STUDY:  MRI of the  right ankle without IV contrast;  2023 2:00 pm      INDICATION:  Signs/Symptoms:pain.      COMPARISON:  2023      ACCESSION NUMBER(S):  [id number]      ORDERING CLINICIAN:  MORGAN ENGLISH      TECHNIQUE:  MR imaging of the  right ankle was obtained  without administration  of intravenous contrast medium.      FINDINGS:  TENDONS:  The extensor tendons are intact and demonstrate a normal course. The  flexor tendons are intact and demonstrate a normal course. The  peroneal tendons are intact and demonstrate a normal course. The  Achilles tendon is intact. The plantar aponeurosis is intact.      LIGAMENTS:  There is mild hyperintensity of the deltoid ligament without  discontinuity suggesting a low-grade sprain. The anterior talofibular  ligament is indistinct and diminutive suggesting a moderate grade  sprain. Similar appearance of the posterior talofibular ligament. The  calcaneofibular ligament is intact. The anterior and posterior  tibiofibular ligaments are intact. The spring  ligament is intact.      JOINTS:  There is no dislocation. There is no joint effusion. The articular  cartilage of the ankle joint is normal. There is no osteochondral  defect in the talar dome.      OSSEOUS STRUCTURES:  There is marrow edema of the medial malleolus without a fracture  line.. There is no fracture or contusion. There is no marrow  replacing lesion.      SOFT TISSUES:  There is no muscle atrophy or tear.  The tarsal tunnel is normal.  The sinus tarsi is normal with preservation of fat signal.      Impression: Findings consistent with moderate grade low ankle sprain including  moderate grade sprain of the anterior talofibular ligament and  posterior talofibular ligament. Low-grade sprain of the deltoid  ligament. There is marrow edema of the medial malleolus suggesting  osseous contusion.      MACRO:  None      Signed by: Brayan Vogel 11/1/2023 6:01 PM  Dictation workstation:   [id number]      Procedure: None    Assessment:  1.  Right ankle sprain  2.  Right ankle instability    Plan: Mary Carmen presented for an MRI follow-up, which moderate ankle sprain with some bone bruising of the medial malleolus.  She is making slow progress.  Continue with a home PT program, activity modification.  Ankle brace as needed.  MRI were discussed in detail the patient, there is no significant structural damage, no indication for any surgical intervention.  At this point she is doing well we will have her follow-up as needed.    No orders of the defined types were placed in this encounter.     At the conclusion of the visit there were no further questions by the patient/family regarding their plan of care.  Patient was instructed to call or return with any issues, questions, or concerns regarding their injury and/or treatment plan described above.     11/07/23 at 10:49 AM - Clotilde Mendoza MD    Office: [phone]    This note was prepared using voice recognition software.  The details of this note are correct and  have been reviewed, and corrected to the best of my ability.  Some grammatical errors may persist related to the Dragon software.

## 2024-01-27 PROBLEM — J40 BRONCHITIS: Status: RESOLVED | Noted: 2023-10-15 | Resolved: 2024-01-27

## 2024-01-27 PROBLEM — F41.9 ANXIETY: Status: RESOLVED | Noted: 2023-07-28 | Resolved: 2024-01-27

## 2024-01-27 ASSESSMENT — ENCOUNTER SYMPTOMS
WHEEZING: 0
SHORTNESS OF BREATH: 0
CONSTITUTIONAL NEGATIVE: 1

## 2024-01-27 NOTE — PROGRESS NOTES
Subjective   Patient ID: Mary Carmen Jaeger is a 33 y.o. female who presents for Follow-up.  Last physical:7/31/23    Is pt fasting? No   Did pt do fasting labs from July? No   BPs at home (put an avg of the numbers)- not doing   Last use of albuterol inh- 1 month   Using wixela bid consistently?  No, only for colds.   Which baclofen is she on? Has not used in months   Which acetaminophen is she on? 500mg as needed OTC   Which zofran is she on? No   Any other questions or concerns that pt wants to discuss today? No     Pt has a psychiatrist      HPI  Last labs-4/2022 cbc nl, bmp neg   Due for labs- will do at     Cholesterol   Date Value Ref Range Status   06/25/2018 150 0 - 199 mg/dL Final     Comment:     .      AGE      DESIRABLE   BORDERLINE HIGH   HIGH     0-19 Y     0 - 169       170 - 199     >/= 200    20-24 Y     0 - 189       190 - 224     >/= 225         >24 Y     0 - 199       200 - 239     >/= 240   **All ranges are based on fasting samples. Specific   therapeutic targets will vary based on patient-specific   cardiac risk.  .   Pediatric guidelines reference:Pediatrics 2011, 128(S5).   Adult guidelines reference: NCEP ATPIII Guidelines,     URIEL 2001, 258:2486-97  .   Venipuncture immediately after or during the    administration of Metamizole may lead to falsely   low results. Testing should be performed immediately   prior to Metamizole dosing.       Triglycerides   Date Value Ref Range Status   06/25/2018 49 0 - 149 mg/dL Final     Comment:     .      AGE      DESIRABLE   BORDERLINE HIGH   HIGH     VERY HIGH   0 D-90 D    19 - 174         ----         ----        ----  91 D- 9 Y     0 -  74        75 -  99     >/= 100      ----    10-19 Y     0 -  89        90 - 129     >/= 130      ----    20-24 Y     0 - 114       115 - 149     >/= 150      ----         >24 Y     0 - 149       150 - 199    200- 499    >/= 500  .   Venipuncture immediately after or during the    administration of Metamizole may lead  "to falsely   low results. Testing should be performed immediately   prior to Metamizole dosing.       HDL   Date Value Ref Range Status   2018 49.5 mg/dL Final     Comment:     .      AGE      VERY LOW   LOW     NORMAL    HIGH       0-19 Y       < 35   < 40     40-45     ----    20-24 Y       ----   < 40       >45     ----      >24 Y       ----   < 40     40-60      >60  .       No results found for: \"LDL\"  TSH   Date Value Ref Range Status   2018 1.37 0.44 - 3.98 mIU/L Final     Comment:      TSH testing is performed using different testing    methodology at [facility] than at other    [facility] hospitals. Direct result comparisons should    only be made within the same method.  .   Patients receiving more than 5 mg/day of biotin may have interference   in test results.  A sample should be taken no sooner than eight hours   after  previous dose. Contact [phone] for additional information.       No results found for: \"A1C\"  No components found for: \"POCA1C\"  No results found for: \"ALBUR\"  No components found for: \"POCALBUR\"    Mat gm- age 49 of rectal ca  Having issues with hemorrhoids since pregnancy  Friend-has br ca    Exercise-walking, has a peloton  Incr water  Lost 18lbs  Diet-breakfast 40oz water, diet dr pepper 1-2 a day; stopped tea since can't do fake sugar; nausea as usual in am; sometimes leftovers  Lunch-ham and swiss on white, water  No caffeine after 3p  Dinner-water, protein, starch, veggie  Snacks-sweets  Etoh-none    Building a house-done in july-will put peloton there    Immunization History   Administered Date(s) Administered    Influenza, injectable, quadrivalent 2020    Pfizer Purple Cap SARS-CoV-2 2021, 04/15/2021, 2021    Pneumococcal polysaccharide vaccine, 23-valent, age 2 years and older (PNEUMOVAX 23) 2020    Tdap vaccine, age 7 year and older (BOOSTRIX, ADACEL) 05/15/2019        No care team member to display     Review of Systems "   Constitutional: Negative.    Respiratory:  Negative for shortness of breath and wheezing.    Cardiovascular:  Negative for chest pain.       Objective   Visit Vitals  /73   Pulse 73   Temp 36.3 °C (97.3 °F)      BP Readings from Last 3 Encounters:   01/29/24 106/73   07/31/23 100/67   06/26/23 107/66     Wt Readings from Last 3 Encounters:   01/29/24 91.1 kg (200 lb 12.8 oz)   07/31/23 98 kg (216 lb)   06/26/23 98.9 kg (218 lb)       The ASCVD Risk score (Frank KAUR, et al., 2019) failed to calculate for the following reasons:    The 2019 ASCVD risk score is only valid for ages 40 to 79     Physical Exam  Constitutional:       General: She is not in acute distress.     Appearance: Normal appearance.   Neurological:      Mental Status: She is alert.         Assessment/Plan   Diagnoses and all orders for this visit:  BMI 33.0-33.9,adult  Hemorrhoids, unspecified hemorrhoid type  -     Referral to Gastroenterology; Future  Other orders  -     Follow Up In Primary Care - Health Maintenance; Future

## 2024-01-29 ENCOUNTER — OFFICE VISIT (OUTPATIENT)
Dept: PRIMARY CARE | Facility: CLINIC | Age: 34
End: 2024-01-29
Payer: COMMERCIAL

## 2024-01-29 VITALS
BODY MASS INDEX: 33.45 KG/M2 | HEART RATE: 73 BPM | TEMPERATURE: 97.3 F | SYSTOLIC BLOOD PRESSURE: 106 MMHG | WEIGHT: 200.8 LBS | OXYGEN SATURATION: 99 % | HEIGHT: 65 IN | DIASTOLIC BLOOD PRESSURE: 73 MMHG

## 2024-01-29 DIAGNOSIS — K64.9 HEMORRHOIDS, UNSPECIFIED HEMORRHOID TYPE: ICD-10-CM

## 2024-01-29 PROCEDURE — 1036F TOBACCO NON-USER: CPT | Performed by: NURSE PRACTITIONER

## 2024-01-29 PROCEDURE — 99214 OFFICE O/P EST MOD 30 MIN: CPT | Performed by: NURSE PRACTITIONER

## 2024-01-29 PROCEDURE — 3008F BODY MASS INDEX DOCD: CPT | Performed by: NURSE PRACTITIONER

## 2024-01-29 NOTE — PATIENT INSTRUCTIONS
You can use the lab in our building when fasting. The hrs are: Monday-Friday, 7 a.m. - 5 p.m., Saturday 8 a.m. - 12 noon.   No appt needed, BUT YOU DO NEED THE PAPER ORDER.    Fasting is no food, drink, gum or mints other than water for 8-12 hrs.   Results will be back in 2-3 business days for most labs. It is always recommended for any orders (labs, xrays, ultrasounds,MRI, ct scan, procedures etc) to check with your insurance provider for expected costs or expenses to you.     See zenon monson    Exercise-cardio 4-5d/wk 30min each day  Diet-Breakfast-toast (my favorite Elva Enrrique Delightful multi-grain and Akhil's Killer Bread thin-sliced Good Seed)/bagel/English muffin-whole wheat flour as a 1st ingredient or cereal/oatmeal/granola bar-fiber 4g or more; protein like eggs or peanut butter is Ok  Lunch-protein, veg 1c  Dinner-protein, veg 1c; if having potatoes, rice, noodles, or pasta-->fist sized; could try brown rice or whole wheat pasta or quinoa  Fruit 2 a day  Dairy 2 a day-milk, almond milk, soy milk, yogurt, cottage cheese, yogurt  Snacks-Protein-hard boiled egg, nuts (walnuts/almonds/pecans/pistachios 1/4c), hummus, beef/deer jerky; vegetable, fruit, dairy-milk(1%, skim, almond, soy)/cheese (not a lot of cheddar)/yogurt (Greek is best-my favorite Dannon Fruit on the Bottom Greek)/cottage cheese 2%; triscuits, popcorn have a lot of fiber; serving size  Water  Limit alcohol to 2 drinks per day (1 drink=12oz beer or 5oz wine or 1 1/2oz liquor)  appt in  6  months; be fasting      I will communicate with you via [employer] regarding messages and results. If you need help with this, you can call the support line at [phone].    IT WAS A PLEASURE TO SEE YOU TODAY. THANK YOU FOR CHOOSING US FOR YOUR HEALTHCARE NEEDS.

## 2024-01-30 ENCOUNTER — LAB (OUTPATIENT)
Dept: LAB | Facility: LAB | Age: 34
End: 2024-01-30
Payer: COMMERCIAL

## 2024-01-30 DIAGNOSIS — Z86.711 PERSONAL HISTORY OF PULMONARY EMBOLISM: Primary | ICD-10-CM

## 2024-01-30 DIAGNOSIS — Z00.00 ENCOUNTER FOR GENERAL ADULT MEDICAL EXAMINATION WITHOUT ABNORMAL FINDINGS: ICD-10-CM

## 2024-01-30 LAB
ALBUMIN SERPL BCP-MCNC: 4.2 G/DL (ref 3.4–5)
ALP SERPL-CCNC: 57 U/L (ref 33–110)
ALT SERPL W P-5'-P-CCNC: 11 U/L (ref 7–45)
ANION GAP SERPL CALC-SCNC: 13 MMOL/L (ref 10–20)
AST SERPL W P-5'-P-CCNC: 11 U/L (ref 9–39)
BASOPHILS # BLD AUTO: 0.04 X10*3/UL (ref 0–0.1)
BASOPHILS NFR BLD AUTO: 0.8 %
BILIRUB SERPL-MCNC: 0.9 MG/DL (ref 0–1.2)
BUN SERPL-MCNC: 12 MG/DL (ref 6–23)
CALCIUM SERPL-MCNC: 9.9 MG/DL (ref 8.6–10.6)
CHLORIDE SERPL-SCNC: 106 MMOL/L (ref 98–107)
CHOLEST SERPL-MCNC: 153 MG/DL (ref 0–199)
CHOLESTEROL/HDL RATIO: 3.5
CO2 SERPL-SCNC: 26 MMOL/L (ref 21–32)
CREAT SERPL-MCNC: 0.69 MG/DL (ref 0.5–1.05)
D DIMER PPP FEU-MCNC: 216 NG/ML FEU
EGFRCR SERPLBLD CKD-EPI 2021: >90 ML/MIN/1.73M*2
EOSINOPHIL # BLD AUTO: 0.2 X10*3/UL (ref 0–0.7)
EOSINOPHIL NFR BLD AUTO: 3.9 %
ERYTHROCYTE [DISTWIDTH] IN BLOOD BY AUTOMATED COUNT: 12.4 % (ref 11.5–14.5)
GLUCOSE SERPL-MCNC: 96 MG/DL (ref 74–99)
HCT VFR BLD AUTO: 43.9 % (ref 36–46)
HDLC SERPL-MCNC: 43.4 MG/DL
HGB BLD-MCNC: 14.9 G/DL (ref 12–16)
IMM GRANULOCYTES # BLD AUTO: 0.01 X10*3/UL (ref 0–0.7)
IMM GRANULOCYTES NFR BLD AUTO: 0.2 % (ref 0–0.9)
LDLC SERPL CALC-MCNC: 96 MG/DL
LYMPHOCYTES # BLD AUTO: 1.17 X10*3/UL (ref 1.2–4.8)
LYMPHOCYTES NFR BLD AUTO: 22.9 %
MCH RBC QN AUTO: 28.8 PG (ref 26–34)
MCHC RBC AUTO-ENTMCNC: 33.9 G/DL (ref 32–36)
MCV RBC AUTO: 85 FL (ref 80–100)
MONOCYTES # BLD AUTO: 0.27 X10*3/UL (ref 0.1–1)
MONOCYTES NFR BLD AUTO: 5.3 %
NEUTROPHILS # BLD AUTO: 3.41 X10*3/UL (ref 1.2–7.7)
NEUTROPHILS NFR BLD AUTO: 66.9 %
NON HDL CHOLESTEROL: 110 MG/DL (ref 0–149)
NRBC BLD-RTO: 0 /100 WBCS (ref 0–0)
PLATELET # BLD AUTO: 284 X10*3/UL (ref 150–450)
POTASSIUM SERPL-SCNC: 4.6 MMOL/L (ref 3.5–5.3)
PROT SERPL-MCNC: 6.6 G/DL (ref 6.4–8.2)
RBC # BLD AUTO: 5.17 X10*6/UL (ref 4–5.2)
SODIUM SERPL-SCNC: 140 MMOL/L (ref 136–145)
TRIGL SERPL-MCNC: 67 MG/DL (ref 0–149)
TSH SERPL-ACNC: 1.21 MIU/L (ref 0.44–3.98)
VLDL: 13 MG/DL (ref 0–40)
WBC # BLD AUTO: 5.1 X10*3/UL (ref 4.4–11.3)

## 2024-01-30 PROCEDURE — 80061 LIPID PANEL: CPT

## 2024-01-30 PROCEDURE — 85025 COMPLETE CBC W/AUTO DIFF WBC: CPT

## 2024-01-30 PROCEDURE — 80053 COMPREHEN METABOLIC PANEL: CPT

## 2024-01-30 PROCEDURE — 36415 COLL VENOUS BLD VENIPUNCTURE: CPT

## 2024-01-30 PROCEDURE — 85379 FIBRIN DEGRADATION QUANT: CPT

## 2024-01-30 PROCEDURE — 84443 ASSAY THYROID STIM HORMONE: CPT

## 2024-07-17 ENCOUNTER — TELEPHONE (OUTPATIENT)
Dept: PRIMARY CARE | Facility: CLINIC | Age: 34
End: 2024-07-17
Payer: COMMERCIAL

## 2024-07-17 DIAGNOSIS — Z80.0 FAMILY HISTORY OF RECTAL CANCER: Primary | ICD-10-CM

## 2024-07-17 NOTE — TELEPHONE ENCOUNTER
Pt would like to get her Colonoscopy done. She has a family history of colon cancer. Her next wellness appt is 8/6/24.

## 2024-07-17 NOTE — TELEPHONE ENCOUNTER
I will put an order in for a gi dr so that pt can discuss this with them. Pt can call [phone] to schedule this.  Typically, we would recommend a colonoscopy sooner than age 45 if mom, dad, brother or sister had colon cancer earlier than age 55.  It looks like grandma had rectal cancer.

## 2024-08-05 PROBLEM — S99.919A ANKLE INJURY: Status: RESOLVED | Noted: 2023-10-15 | Resolved: 2024-08-05

## 2024-08-05 ASSESSMENT — ENCOUNTER SYMPTOMS
VOMITING: 0
FATIGUE: 0
TROUBLE SWALLOWING: 0
DIZZINESS: 0
EYE PAIN: 0
HEMATURIA: 0
HALLUCINATIONS: 0
PALPITATIONS: 0
SHORTNESS OF BREATH: 0
POLYDIPSIA: 0
COUGH: 0
BRUISES/BLEEDS EASILY: 0
CONFUSION: 0
FREQUENCY: 0
POLYPHAGIA: 0
BLOOD IN STOOL: 0
APPETITE CHANGE: 0
SORE THROAT: 0
CHILLS: 0
EYE REDNESS: 0
ABDOMINAL PAIN: 0
FEVER: 0
DYSURIA: 0
BACK PAIN: 0
UNEXPECTED WEIGHT CHANGE: 0
ADENOPATHY: 0
WOUND: 0
NECK PAIN: 0
HEADACHES: 0
EYE DISCHARGE: 0

## 2024-08-05 NOTE — PATIENT INSTRUCTIONS
Due in nov for next pap with dr arriaza    Consider trazodone  See therapist  Continue sertraline    Transderm-scop for motion sickness    Rizatriptan for ocular migraines  Zofran for nausea/vomiting      Handouts given to pt:  physical handout      Labs:    You can use the lab in our building when fasting. The hrs are: Monday-Friday, 7 a.m. - 5 p.m., Saturday 8 a.m. - 12 noon.   No appt needed, BUT YOU DO NEED THE PAPER ORDER.    Fasting is no food, drink, gum or mints other than water for 12 hrs.   Results will be back in 2-3 business days for most labs. It is always recommended for any orders (labs, xrays, ultrasounds,MRI, ct scan, procedures etc) to check with your insurance provider for expected costs or expenses to you.       You will get your results via phone from my medical assistant if you do not have MyChart.  OR  You will get your results via MyChart    If a result is urgent, I will call to speak to you.    Vaccines:  Up to date    General recommendations:  Exercise-cardio 4-5d/wk 30min each day  Diet-Breakfast-toast (my favorite Elva Osuna Delighful Multigrain or Akhil's Killer Bread Good Seed thin-sliced)/bagel/English muffin-whole wheat flour as a 1st ingredient or cereal/oatmeal/granola bar-fiber 4g or more or protein like eggs or peanut butter; optional veggies  Lunch-protein, 1/2c carb or 2 slices bread, veg 1c  Dinner-protein, fist sized carb, veg 1c  Fruit 2 a day  Dairy 2 a day-milk, soy milk, almond milk, cheese, yogurt, cottage cheese  Snacks-Protein-hard boiled egg, nuts (walnuts/almonds/pecans/pistachios 1/4c), hummus, beef/deer jerky or meat sticks; vegetable, fruit, dairy-milk(1%, skim, almond, soy)/cheese (not a lot of cheddar)/yogurt (Greek is best-my favorite Dannon Fruit on the Bottom Greek)/cottage cheese 2%; triscuits/ popcorn/wheat thins have a lot of fiber; follow serving size on bag/box/container  increase water  Limit alcohol to 1 drink per day for women and 2 drinks per day for men  (1 drink=12oz beer or 5oz wine or 1 1/2oz liquor)  Calcium: 500mg 1 twice a day if age 50 and younger and 600mg 1 twice a day if over age 50 (calcium citrate can be taken without food)  Vitamin D: 800-5000 IU/day  Limit salt to <2300mg a day if age 50 and under and <1500mg a day if over age 50/have high bp or diabetes or kidney disease  Recommend folate for childbearing age women 0.4mg per day (can be found in a multivitamin)  Recommend 18mg/dL of iron a day if age 50 and under and 8mg/dL a day if over age 50; take on an empty stomach at bedtime  Use sunscreen   Wear seatbelt  Recommend safe sex practices: using condoms everytime you have sex, discuss with a new partner about their past partners/history of STDs/drug use, avoid drinking alcohol or using drugs as this increases the chance that you will participate in high-risk sex, for oral sex help protect your mouth by having your partner use a condom (male or female), women should not douche after sex, be aware of your partner's body and your body-look for signs of a sore, blister, rash, or discharge, and have regular exams and periodic tests for STDs.  No distracted driving  No driving when under influence of substances  Wear a seatbelt  Eye dr every 1-2yrs  Dentist every 6-12 mon  Tetanus shot every 10yrs  Recommend flu vaccine in the fall  Appt in 3mon for follow up on diet exercise anxiety and 1 year for physical      I will communicate with you via [employer] regarding messages and results. If you need help with this, you can call the support line at [phone].    IT WAS A PLEASURE TO SEE YOU TODAY. THANK YOU FOR CHOOSING US FOR YOUR HEALTHCARE NEEDS.

## 2024-08-05 NOTE — PROGRESS NOTES
Subjective   Patient ID: Mary Carmen Jaeger is a 34 y.o. female who presents for Annual Exam.    Is pt fasting? Yes   Does pt see any providers other than care team below:   nnamdi Mendoza [facility], [facility]  Has appt with gi  in sept    Last albuterol inh- has been 6 months   Any questions or concerns? Would like d-dimer checked due to history of PE,, cortisol levels  Phq9=2  , gad7=6    No care team member to display    HPI  Last labs-1/2024   CBC (complete blood count) was normal which looks at infection and anemia markers.  TSH (thyroid test) was normal.  Cholesterol:  Triglycerides, the carrier of the cholesterol, are normal  HDL, the good cholesterol, is low at 43.  Goal >40 for men and >50 for women. Increase cardio exercise. This is the type of exercise that raises your heartrate.  LDL, the bad cholesterol, is normal.  Sugar (aka glucose), kidney function, liver function and electrolytes in the CMP (comprehensive metabolic panel) were normal.  Ddimer, which looks at do you have a clot, is normal.  4/2022 cbc nl, bmp neg  Due for labs- none    Cholesterol   Date Value Ref Range Status   01/30/2024 153 0 - 199 mg/dL Final     Comment:           Age      Desirable   Borderline High   High     0-19 Y     0 - 169       170 - 199     >/= 200    20-24 Y     0 - 189       190 - 224     >/= 225         >24 Y     0 - 199       200 - 239     >/= 240   **All ranges are based on fasting samples. Specific   therapeutic targets will vary based on patient-specific   cardiac risk.    Pediatric guidelines reference:Pediatrics 2011, 128(S5).Adult guidelines reference: NCEP ATPIII Guidelines,URIEL 2001, 258:2486-97    Venipuncture immediately after or during the administration of Metamizole may lead to falsely low results. Testing should be performed immediately prior to Metamizole dosing.   06/25/2018 150 0 - 199 mg/dL Final     Comment:     .      AGE      DESIRABLE   BORDERLINE HIGH   HIGH     0-19 Y     0 - 169       170 - 199      >/= 200    20-24 Y     0 - 189       190 - 224     >/= 225         >24 Y     0 - 199       200 - 239     >/= 240   **All ranges are based on fasting samples. Specific   therapeutic targets will vary based on patient-specific   cardiac risk.  .   Pediatric guidelines reference:Pediatrics 2011, 128(S5).   Adult guidelines reference: NCEP ATPIII Guidelines,     URIEL 2001, 258:2486-97  .   Venipuncture immediately after or during the    administration of Metamizole may lead to falsely   low results. Testing should be performed immediately   prior to Metamizole dosing.       Triglycerides   Date Value Ref Range Status   01/30/2024 67 0 - 149 mg/dL Final     Comment:        Age         Desirable   Borderline High   High     Very High   0 D-90 D    19 - 174         ----         ----        ----  91 D- 9 Y     0 -  74        75 -  99     >/= 100      ----    10-19 Y     0 -  89        90 - 129     >/= 130      ----    20-24 Y     0 - 114       115 - 149     >/= 150      ----         >24 Y     0 - 149       150 - 199    200- 499    >/= 500    Venipuncture immediately after or during the administration of Metamizole may lead to falsely low results. Testing should be performed immediately prior to Metamizole dosing.   06/25/2018 49 0 - 149 mg/dL Final     Comment:     .      AGE      DESIRABLE   BORDERLINE HIGH   HIGH     VERY HIGH   0 D-90 D    19 - 174         ----         ----        ----  91 D- 9 Y     0 -  74        75 -  99     >/= 100      ----    10-19 Y     0 -  89        90 - 129     >/= 130      ----    20-24 Y     0 - 114       115 - 149     >/= 150      ----         >24 Y     0 - 149       150 - 199    200- 499    >/= 500  .   Venipuncture immediately after or during the    administration of Metamizole may lead to falsely   low results. Testing should be performed immediately   prior to Metamizole dosing.       HDL-Cholesterol   Date Value Ref Range Status   01/30/2024 43.4 mg/dL Final     Comment:       Age     "   Very Low   Low     Normal    High    0-19 Y    < 35      < 40     40-45     ----  20-24 Y    ----     < 40      >45      ----        >24 Y      ----     < 40     40-60      >60       HDL   Date Value Ref Range Status   06/25/2018 49.5 mg/dL Final     Comment:     .      AGE      VERY LOW   LOW     NORMAL    HIGH       0-19 Y       < 35   < 40     40-45     ----    20-24 Y       ----   < 40       >45     ----      >24 Y       ----   < 40     40-60      >60  .       No results found for: \"LDL\"  Thyroid Stimulating Hormone   Date Value Ref Range Status   01/30/2024 1.21 0.44 - 3.98 mIU/L Final     No results found for: \"A1C\"  No components found for: \"POCA1C\"  No results found for: \"ALBUR\"  No components found for: \"POCALBUR\"      Other concerns: Would like d-dimer checked due to history of PE,, cortisol levels    Unpacking house still  Decision fatigue  Wt gain, eye twitching    Going to [place] soon-motion sickness with roller coasters    Ocular migraines with aura; vomiting        bps at home- none    ER/urgicare visits in the last year- none  Hospitalizations in the last year- none      last Pap- 11/2019, due 11/2024  H/o abn pap-none    FH ovarian, endometrial, cervical, uterine ca-none    Current birth control method-none;  had vasectomy  No change in contraception desired    FH br ca-none    FH colon ca-mgm-rectal  Seeing 8/2024    Pancreatic ca-pgf    On semaglutide with b12 injection x 2wks-[facility] in [place]    Exercise- walk 1mi at bedtime x 5wks-dog walk; sometimes twice a day; peloton not set up yet-in box in basement  5wks ago moved in. Only been in house x 3wks due to vacays  Diet-lower carbs, decr portions before moved  Body mass index is 32.08 kg/m².    last dental appt- q6mon    BMs-regular  Sleep-hard to fall asleep and stay asleep; no snoring or apnea  no cp, swelling, sob, abd pain, n/v/d/c, blood in stool or black stools  STI testing including hiv (age 15-65) and hep c " screening (18-79)-declines        Immunization History   Administered Date(s) Administered    Influenza, injectable, quadrivalent 01/20/2020    Pfizer Purple Cap SARS-CoV-2 03/23/2021, 04/15/2021, 12/03/2021    Pneumococcal polysaccharide vaccine, 23-valent, age 2 years and older (PNEUMOVAX 23) 08/13/2020    Tdap vaccine, age 7 year and older (BOOSTRIX, ADACEL) 05/15/2019       fractures in lifetime-rt ankle age 13, toes  H/o osteoporosis-?pgm-on LT steroids      FH heart attack, heart surgery-none  FH stroke-none    The ASCVD Risk score (Frank DK, et al., 2019) failed to calculate for the following reasons:    The 2019 ASCVD risk score is only valid for ages 40 to 79        Review of Systems   Constitutional:  Negative for appetite change, chills, fatigue, fever and unexpected weight change.   HENT:  Negative for congestion, ear pain, sore throat and trouble swallowing.    Eyes:  Negative for pain, discharge and redness.   Respiratory:  Negative for cough and shortness of breath.    Cardiovascular:  Negative for chest pain and palpitations.   Gastrointestinal:  Negative for abdominal pain, blood in stool and vomiting.   Endocrine: Negative for polydipsia, polyphagia and polyuria.   Genitourinary:  Negative for dysuria, frequency, hematuria and urgency.   Musculoskeletal:  Negative for back pain and neck pain.   Skin:  Negative for rash and wound.   Allergic/Immunologic: Negative for immunocompromised state.   Neurological:  Negative for dizziness, syncope and headaches.   Hematological:  Negative for adenopathy. Does not bruise/bleed easily.   Psychiatric/Behavioral:  Negative for confusion and hallucinations.        Objective   Visit Vitals  /69   Pulse 85   Temp 36.1 °C (96.9 °F)        BP Readings from Last 3 Encounters:   08/06/24 100/69   01/29/24 106/73   07/31/23 100/67     Wt Readings from Last 3 Encounters:   08/06/24 87.5 kg (192 lb 12.8 oz)   01/29/24 91.1 kg (200 lb 12.8 oz)   07/31/23 98 kg (216  lb)           Physical Exam  Constitutional:       General: She is not in acute distress.     Appearance: Normal appearance. She is not ill-appearing.   HENT:      Head: Normocephalic.      Right Ear: Tympanic membrane, ear canal and external ear normal.      Left Ear: Tympanic membrane, ear canal and external ear normal.      Nose: Nose normal.      Mouth/Throat:      Mouth: Mucous membranes are moist.      Pharynx: Oropharynx is clear.   Eyes:      Extraocular Movements: Extraocular movements intact.      Conjunctiva/sclera: Conjunctivae normal.      Pupils: Pupils are equal, round, and reactive to light.   Cardiovascular:      Rate and Rhythm: Normal rate and regular rhythm.      Heart sounds: Normal heart sounds. No murmur heard.  Pulmonary:      Effort: Pulmonary effort is normal. No respiratory distress.      Breath sounds: Normal breath sounds. No wheezing, rhonchi or rales.   Abdominal:      General: Bowel sounds are normal.      Palpations: Abdomen is soft. There is no mass.      Tenderness: There is no abdominal tenderness.   Musculoskeletal:         General: No swelling or tenderness. Normal range of motion.      Cervical back: Normal range of motion and neck supple.      Right lower leg: No edema.      Left lower leg: No edema.   Skin:     General: Skin is warm.      Findings: No rash.   Neurological:      General: No focal deficit present.      Mental Status: She is alert and oriented to person, place, and time.      Cranial Nerves: No cranial nerve deficit.      Motor: No weakness.   Psychiatric:         Mood and Affect: Mood normal.         Behavior: Behavior normal.         Assessment/Plan   Diagnoses and all orders for this visit:  Routine general medical examination at a health care facility  MANUEL (generalized anxiety disorder)  -     Referral to Psychology; Future  History of pulmonary embolism  Weight gain  -     Cortisol; Future  BMI 32.0-32.9,adult  Ocular migraine  -     rizatriptan MLT  (Maxalt-MLT) 10 mg disintegrating tablet; Take 1 tablet (10 mg) by mouth 1 time if needed for migraine. May repeat in 2 hours if unresolved. Do not exceed 20 mg in 24 hours.  Nausea and vomiting, unspecified vomiting type  -     ondansetron (Zofran) 4 mg tablet; Take 1 tablet (4 mg) by mouth every 8 hours if needed for nausea or vomiting.  Motion sickness, initial encounter  -     scopolamine (Transderm-Scop) 1 mg over 3 days patch 3 day; Place 1 patch over 72 hours on the skin every 3rd day if needed (nausea).  Other orders  -     Follow Up In Primary Care - Health Maintenance

## 2024-08-06 ENCOUNTER — APPOINTMENT (OUTPATIENT)
Dept: PRIMARY CARE | Facility: CLINIC | Age: 34
End: 2024-08-06
Payer: COMMERCIAL

## 2024-08-06 VITALS
TEMPERATURE: 96.9 F | OXYGEN SATURATION: 96 % | WEIGHT: 192.8 LBS | SYSTOLIC BLOOD PRESSURE: 100 MMHG | HEART RATE: 85 BPM | HEIGHT: 65 IN | DIASTOLIC BLOOD PRESSURE: 69 MMHG | BODY MASS INDEX: 32.12 KG/M2

## 2024-08-06 DIAGNOSIS — R11.2 NAUSEA AND VOMITING, UNSPECIFIED VOMITING TYPE: ICD-10-CM

## 2024-08-06 DIAGNOSIS — T75.3XXA MOTION SICKNESS, INITIAL ENCOUNTER: ICD-10-CM

## 2024-08-06 DIAGNOSIS — G43.109 OCULAR MIGRAINE: ICD-10-CM

## 2024-08-06 DIAGNOSIS — Z00.00 ROUTINE GENERAL MEDICAL EXAMINATION AT A HEALTH CARE FACILITY: Primary | ICD-10-CM

## 2024-08-06 DIAGNOSIS — Z86.711 HISTORY OF PULMONARY EMBOLISM: ICD-10-CM

## 2024-08-06 DIAGNOSIS — R63.5 WEIGHT GAIN: ICD-10-CM

## 2024-08-06 DIAGNOSIS — F41.1 GAD (GENERALIZED ANXIETY DISORDER): ICD-10-CM

## 2024-08-06 PROCEDURE — 99395 PREV VISIT EST AGE 18-39: CPT | Performed by: NURSE PRACTITIONER

## 2024-08-06 PROCEDURE — 1036F TOBACCO NON-USER: CPT | Performed by: NURSE PRACTITIONER

## 2024-08-06 PROCEDURE — 99213 OFFICE O/P EST LOW 20 MIN: CPT | Performed by: NURSE PRACTITIONER

## 2024-08-06 PROCEDURE — 3008F BODY MASS INDEX DOCD: CPT | Performed by: NURSE PRACTITIONER

## 2024-08-06 RX ORDER — ONDANSETRON 4 MG/1
4 TABLET, FILM COATED ORAL EVERY 8 HOURS PRN
Qty: 90 TABLET | Refills: 1 | Status: SHIPPED | OUTPATIENT
Start: 2024-08-06 | End: 2024-10-05

## 2024-08-06 RX ORDER — SCOLOPAMINE TRANSDERMAL SYSTEM 1 MG/1
1 PATCH, EXTENDED RELEASE TRANSDERMAL
Qty: 7 PATCH | Refills: 0 | Status: SHIPPED | OUTPATIENT
Start: 2024-08-06 | End: 2024-10-05

## 2024-08-06 RX ORDER — RIZATRIPTAN BENZOATE 10 MG/1
10 TABLET, ORALLY DISINTEGRATING ORAL ONCE AS NEEDED
Qty: 12 TABLET | Refills: 5 | Status: SHIPPED | OUTPATIENT
Start: 2024-08-06 | End: 2024-10-17

## 2024-08-06 ASSESSMENT — PATIENT HEALTH QUESTIONNAIRE - PHQ9
8. MOVING OR SPEAKING SO SLOWLY THAT OTHER PEOPLE COULD HAVE NOTICED. OR THE OPPOSITE, BEING SO FIGETY OR RESTLESS THAT YOU HAVE BEEN MOVING AROUND A LOT MORE THAN USUAL: NOT AT ALL
3. TROUBLE FALLING OR STAYING ASLEEP OR SLEEPING TOO MUCH: SEVERAL DAYS
6. FEELING BAD ABOUT YOURSELF - OR THAT YOU ARE A FAILURE OR HAVE LET YOURSELF OR YOUR FAMILY DOWN: NOT AT ALL
4. FEELING TIRED OR HAVING LITTLE ENERGY: SEVERAL DAYS
7. TROUBLE CONCENTRATING ON THINGS, SUCH AS READING THE NEWSPAPER OR WATCHING TELEVISION: NOT AT ALL
1. LITTLE INTEREST OR PLEASURE IN DOING THINGS: NOT AT ALL
5. POOR APPETITE OR OVEREATING: NOT AT ALL
9. THOUGHTS THAT YOU WOULD BE BETTER OFF DEAD, OR OF HURTING YOURSELF: NOT AT ALL
SUM OF ALL RESPONSES TO PHQ9 QUESTIONS 1 AND 2: 0
SUM OF ALL RESPONSES TO PHQ QUESTIONS 1-9: 2
2. FEELING DOWN, DEPRESSED OR HOPELESS: NOT AT ALL
10. IF YOU CHECKED OFF ANY PROBLEMS, HOW DIFFICULT HAVE THESE PROBLEMS MADE IT FOR YOU TO DO YOUR WORK, TAKE CARE OF THINGS AT HOME, OR GET ALONG WITH OTHER PEOPLE: NOT DIFFICULT AT ALL

## 2024-08-06 ASSESSMENT — ANXIETY QUESTIONNAIRES
2. NOT BEING ABLE TO STOP OR CONTROL WORRYING: SEVERAL DAYS
IF YOU CHECKED OFF ANY PROBLEMS ON THIS QUESTIONNAIRE, HOW DIFFICULT HAVE THESE PROBLEMS MADE IT FOR YOU TO DO YOUR WORK, TAKE CARE OF THINGS AT HOME, OR GET ALONG WITH OTHER PEOPLE: SOMEWHAT DIFFICULT
1. FEELING NERVOUS, ANXIOUS, OR ON EDGE: SEVERAL DAYS
6. BECOMING EASILY ANNOYED OR IRRITABLE: SEVERAL DAYS
3. WORRYING TOO MUCH ABOUT DIFFERENT THINGS: SEVERAL DAYS
4. TROUBLE RELAXING: SEVERAL DAYS
5. BEING SO RESTLESS THAT IT IS HARD TO SIT STILL: SEVERAL DAYS
GAD7 TOTAL SCORE: 6
7. FEELING AFRAID AS IF SOMETHING AWFUL MIGHT HAPPEN: NOT AT ALL

## 2024-08-16 DIAGNOSIS — F41.1 GAD (GENERALIZED ANXIETY DISORDER): Primary | ICD-10-CM

## 2024-08-16 RX ORDER — SERTRALINE HYDROCHLORIDE 100 MG/1
100 TABLET, FILM COATED ORAL DAILY
Qty: 90 TABLET | Refills: 2 | Status: SHIPPED | OUTPATIENT
Start: 2024-08-16

## 2024-09-11 PROBLEM — N39.0 URINARY TRACT INFECTION: Status: RESOLVED | Noted: 2024-09-11 | Resolved: 2024-09-11

## 2024-09-11 PROBLEM — M25.579 ANKLE PAIN: Status: RESOLVED | Noted: 2024-09-11 | Resolved: 2024-09-11

## 2024-09-11 PROBLEM — R05.9 COUGH: Status: RESOLVED | Noted: 2024-09-11 | Resolved: 2024-09-11

## 2024-09-11 PROBLEM — J01.90 ACUTE BACTERIAL SINUSITIS: Status: RESOLVED | Noted: 2024-09-11 | Resolved: 2024-09-11

## 2024-09-11 PROBLEM — U07.1 DISEASE DUE TO SEVERE ACUTE RESPIRATORY SYNDROME CORONAVIRUS 2 (SARS-COV-2): Status: RESOLVED | Noted: 2024-09-11 | Resolved: 2024-09-11

## 2024-09-11 PROBLEM — G54.9 NERVE ROOT DISORDER: Status: RESOLVED | Noted: 2024-09-11 | Resolved: 2024-09-11

## 2024-09-11 PROBLEM — R19.7 DIARRHEA: Status: RESOLVED | Noted: 2024-09-11 | Resolved: 2024-09-11

## 2024-09-11 PROBLEM — B96.89 ACUTE BACTERIAL SINUSITIS: Status: RESOLVED | Noted: 2024-09-11 | Resolved: 2024-09-11

## 2024-09-11 PROBLEM — A08.4 VIRAL GASTROENTERITIS: Status: RESOLVED | Noted: 2024-09-11 | Resolved: 2024-09-11

## 2024-09-11 PROBLEM — S93.409A SPRAIN OF ANKLE: Status: RESOLVED | Noted: 2023-07-03 | Resolved: 2024-09-11

## 2024-09-11 RX ORDER — DESOGESTREL AND ETHINYL ESTRADIOL 21-5 (28)
KIT ORAL
COMMUNITY

## 2024-09-17 ENCOUNTER — APPOINTMENT (OUTPATIENT)
Dept: GASTROENTEROLOGY | Facility: CLINIC | Age: 34
End: 2024-09-17
Payer: COMMERCIAL

## 2024-09-17 VITALS
DIASTOLIC BLOOD PRESSURE: 69 MMHG | HEART RATE: 84 BPM | HEIGHT: 65 IN | WEIGHT: 190.3 LBS | OXYGEN SATURATION: 97 % | SYSTOLIC BLOOD PRESSURE: 97 MMHG | TEMPERATURE: 99.1 F | BODY MASS INDEX: 31.71 KG/M2

## 2024-09-17 DIAGNOSIS — K62.5 PAINLESS RECTAL BLEEDING: Primary | ICD-10-CM

## 2024-09-17 DIAGNOSIS — R19.8 ALTERNATING CONSTIPATION AND DIARRHEA: ICD-10-CM

## 2024-09-17 DIAGNOSIS — Z80.0 FAMILY HISTORY OF RECTAL CANCER: ICD-10-CM

## 2024-09-17 DIAGNOSIS — R11.2 NAUSEA AND VOMITING, UNSPECIFIED VOMITING TYPE: ICD-10-CM

## 2024-09-17 DIAGNOSIS — R10.30 LOWER ABDOMINAL PAIN: ICD-10-CM

## 2024-09-17 PROBLEM — K59.00 CONSTIPATION: Status: ACTIVE | Noted: 2024-09-17

## 2024-09-17 PROCEDURE — 99204 OFFICE O/P NEW MOD 45 MIN: CPT | Performed by: NURSE PRACTITIONER

## 2024-09-17 PROCEDURE — 1036F TOBACCO NON-USER: CPT | Performed by: NURSE PRACTITIONER

## 2024-09-17 PROCEDURE — 3008F BODY MASS INDEX DOCD: CPT | Performed by: NURSE PRACTITIONER

## 2024-09-17 NOTE — PATIENT INSTRUCTIONS
Daily bowel regimen:  -MiraLAX 1 capful daily in 8 ounces of any liquid.  You can increase or decrease the dose as needed the goal is to have a daily BM and feel fully evacuated.  -Metamucil 1 teaspoon daily in 8 ounces of water  -Footstool or squatty potty during bowel movements  -Adequate water throughout the day 64oz   -Walk 15 to 30 minutes daily to help promote GI motility

## 2024-09-17 NOTE — PROGRESS NOTES
"Subjective   Patient ID: Mary Carmen Jaeger is a 34 y.o. female who presents for Hemorrhoids (Patient presents for hemorrhoids. Moves bowels every other day, with occasional bleeding. Occasional diarrhea. Endorses \"sensitive\" stomach. /).  HPI  Patient here with a constellation of GI complaints that wax and wane.  She is requesting colonoscopy for fear of colorectal cancer.  Patient reports bright red blood per rectum on and off for the past 7 years.  No rectal pain.  This typically happens once weekly.  She also endorses increased mucus in her stool and with wiping.  She strains to have a bowel movement.  Has a BM every other day.  Feels fully evacuated.  Endorses abdominal bloating and lower abdominal pain.  Maternal grandmother diagnosed with colon cancer at 56.  No family history of IBD.    Endorses regular episodes of nausea and vomiting.  This typically depends on certain foods that she eats.  Will also experience episodes of diarrhea and abdominal pain during these flareups.      Weight loss, but taking semaglutide.  Appetite is stable.  Uses NSAIDs once monthly.  Occasional alcohol.  She is a non-smoker.    Current Outpatient Medications on File Prior to Visit   Medication Sig Dispense Refill    acetaminophen (Tylenol) 500 mg tablet Take 1 tablet (500 mg) by mouth if needed.      albuterol 90 mcg/actuation inhaler Inhale 2 puffs every 6 hours if needed for wheezing.      fluticasone propion-salmeteroL (Wixela Inhub) 250-50 mcg/dose diskus inhaler Inhale 1 puff 2 times a day.      ondansetron (Zofran) 4 mg tablet Take 1 tablet (4 mg) by mouth every 8 hours if needed for nausea or vomiting. 90 tablet 1    rizatriptan MLT (Maxalt-MLT) 10 mg disintegrating tablet Take 1 tablet (10 mg) by mouth 1 time if needed for migraine. May repeat in 2 hours if unresolved. Do not exceed 20 mg in 24 hours. 12 tablet 5    scopolamine (Transderm-Scop) 1 mg over 3 days patch 3 day Place 1 patch over 72 hours on the skin every 3rd " day if needed (nausea). 7 patch 0    semaglutide 0.25 mg or 0.5 mg (2 mg/3 mL) pen injector Inject 0.5 mg under the skin every 7 days.      sertraline (Zoloft) 100 mg tablet TAKE ONE TABLET BY MOUTH EVERY DAY 90 tablet 2    [DISCONTINUED] desog-e.estradioL/e.estradioL (Kariva, 28,) 0.15-0.02 mgx21 /0.01 mg x 5 tablet Take by mouth.       No current facility-administered medications on file prior to visit.        Review of Systems   All other systems reviewed and are negative.      Objective   Physical Exam  Vitals reviewed.   Constitutional:       General: She is awake. She is not in acute distress.     Appearance: Normal appearance. She is not ill-appearing, toxic-appearing or diaphoretic.   HENT:      Head: Normocephalic and atraumatic.   Eyes:      General: No scleral icterus.     Pupils: Pupils are equal, round, and reactive to light.   Cardiovascular:      Rate and Rhythm: Normal rate and regular rhythm.      Heart sounds: Normal heart sounds. No murmur heard.  Pulmonary:      Effort: Pulmonary effort is normal. No respiratory distress.      Breath sounds: Normal breath sounds.   Abdominal:      General: Abdomen is protuberant. Bowel sounds are normal.      Palpations: Abdomen is soft. There is no hepatomegaly.      Tenderness: There is no abdominal tenderness. There is no guarding or rebound.   Musculoskeletal:         General: Normal range of motion.      Cervical back: Normal range of motion.      Right lower leg: No edema.      Left lower leg: No edema.   Skin:     General: Skin is warm and dry.      Coloration: Skin is not jaundiced or pale.   Neurological:      General: No focal deficit present.      Mental Status: She is alert and oriented to person, place, and time.      Motor: No weakness.      Gait: Gait normal.   Psychiatric:         Mood and Affect: Mood normal.         Behavior: Behavior is cooperative.         Thought Content: Thought content normal.         Judgment: Judgment normal.       BP  "97/69   Pulse 84   Temp 37.3 °C (99.1 °F) (Temporal)   Ht 1.651 m (5' 5\")   Wt 86.3 kg (190 lb 4.8 oz)   SpO2 97%   BMI 31.67 kg/m²      Lab Results   Component Value Date    WBC 5.1 01/30/2024    HGB 14.9 01/30/2024    HCT 43.9 01/30/2024    MCV 85 01/30/2024     01/30/2024           No lab exists for component: \"LABALBU\"    No results found for: \"AFP\"  Lab Results   Component Value Date    TSH 1.21 01/30/2024         Assessment/Plan   Diagnoses and all orders for this visit:  34-year-old female with a constellation of GI complaints that wax and wane.  Patient is adamant on endoscopy for fear of CRC.  Maternal grandmother diagnosed at 56 years old.  Patient likely with internal hemorrhoids.  However, cannot rule out bleeding polyp/neoplasm or IBD.  Recommend colonoscopy to rule out organic etiology to her complaints.  Recommend extended prep.  Patient to hold semaglutide 1 week prior to her colonoscopy.    Painless rectal bleeding  Family history of rectal cancer  Nausea and vomiting, unspecified vomiting type  Patient with flareups of nausea/vomiting, abdominal pain and diarrhea.  The symptoms wax and wane.  No previous EGD.  Recommend upper endoscopy to rule out gastritis, PUD, duodenitis.  If EGD returns unremarkable recommend workup for Clementina cystitis/cholelithiasis.  Lower abdominal pain  Alternating constipation and diarrhea  Patient to start a daily bowel regimen, precise instruction provided to her in the AVS.    Kindra Leone CNP           "

## 2024-11-04 ENCOUNTER — ANESTHESIA EVENT (OUTPATIENT)
Dept: GASTROENTEROLOGY | Facility: EXTERNAL LOCATION | Age: 34
End: 2024-11-04

## 2024-11-04 ASSESSMENT — ENCOUNTER SYMPTOMS
SHORTNESS OF BREATH: 0
CONSTITUTIONAL NEGATIVE: 1
WHEEZING: 0

## 2024-11-04 NOTE — PROGRESS NOTES
Subjective   Patient ID: Mary Carmen Jaeger is a 34 y.o. female who presents for Follow-up.  Last physical: 8/6/24    Is pt fasting?  No   Does pt want flu shot? Yes   Did pt set up a therapist appt? No   How is pt's anxiety? Around the same   Did pt do cortisol lab? No    Does pt have an appt set up with dr arriaza for next pap? No   Any other questions or concerns that pt wants to discuss today? No       HPI  Last labs-1/2024   CBC (complete blood count) was normal which looks at infection and anemia markers.  TSH (thyroid test) was normal.  Cholesterol:  Triglycerides, the carrier of the cholesterol, are normal  HDL, the good cholesterol, is low at 43.  Goal >40 for men and >50 for women. Increase cardio exercise. This is the type of exercise that raises your heartrate.  LDL, the bad cholesterol, is normal.  Sugar (aka glucose), kidney function, liver function and electrolytes in the CMP (comprehensive metabolic panel) were normal.  Ddimer, which looks at do you have a clot, is normal.  4/2022 cbc nl, bmp neg  Due for labs- none    Cholesterol   Date Value Ref Range Status   01/30/2024 153 0 - 199 mg/dL Final     Comment:           Age      Desirable   Borderline High   High     0-19 Y     0 - 169       170 - 199     >/= 200    20-24 Y     0 - 189       190 - 224     >/= 225         >24 Y     0 - 199       200 - 239     >/= 240   **All ranges are based on fasting samples. Specific   therapeutic targets will vary based on patient-specific   cardiac risk.    Pediatric guidelines reference:Pediatrics 2011, 128(S5).Adult guidelines reference: NCEP ATPIII Guidelines,URIEL 2001, 258:2486-97    Venipuncture immediately after or during the administration of Metamizole may lead to falsely low results. Testing should be performed immediately prior to Metamizole dosing.   06/25/2018 150 0 - 199 mg/dL Final     Comment:     .      AGE      DESIRABLE   BORDERLINE HIGH   HIGH     0-19 Y     0 - 169       170 - 199     >/= 200     20-24 Y     0 - 189       190 - 224     >/= 225         >24 Y     0 - 199       200 - 239     >/= 240   **All ranges are based on fasting samples. Specific   therapeutic targets will vary based on patient-specific   cardiac risk.  .   Pediatric guidelines reference:Pediatrics 2011, 128(S5).   Adult guidelines reference: NCEP ATPIII Guidelines,     RUIEL 2001, 258:2486-97  .   Venipuncture immediately after or during the    administration of Metamizole may lead to falsely   low results. Testing should be performed immediately   prior to Metamizole dosing.       Triglycerides   Date Value Ref Range Status   01/30/2024 67 0 - 149 mg/dL Final     Comment:        Age         Desirable   Borderline High   High     Very High   0 D-90 D    19 - 174         ----         ----        ----  91 D- 9 Y     0 -  74        75 -  99     >/= 100      ----    10-19 Y     0 -  89        90 - 129     >/= 130      ----    20-24 Y     0 - 114       115 - 149     >/= 150      ----         >24 Y     0 - 149       150 - 199    200- 499    >/= 500    Venipuncture immediately after or during the administration of Metamizole may lead to falsely low results. Testing should be performed immediately prior to Metamizole dosing.   06/25/2018 49 0 - 149 mg/dL Final     Comment:     .      AGE      DESIRABLE   BORDERLINE HIGH   HIGH     VERY HIGH   0 D-90 D    19 - 174         ----         ----        ----  91 D- 9 Y     0 -  74        75 -  99     >/= 100      ----    10-19 Y     0 -  89        90 - 129     >/= 130      ----    20-24 Y     0 - 114       115 - 149     >/= 150      ----         >24 Y     0 - 149       150 - 199    200- 499    >/= 500  .   Venipuncture immediately after or during the    administration of Metamizole may lead to falsely   low results. Testing should be performed immediately   prior to Metamizole dosing.       HDL-Cholesterol   Date Value Ref Range Status   01/30/2024 43.4 mg/dL Final     Comment:       Age       Very Low    "Low     Normal    High    0-19 Y    < 35      < 40     40-45     ----  20-24 Y    ----     < 40      >45      ----        >24 Y      ----     < 40     40-60      >60       HDL   Date Value Ref Range Status   06/25/2018 49.5 mg/dL Final     Comment:     .      AGE      VERY LOW   LOW     NORMAL    HIGH       0-19 Y       < 35   < 40     40-45     ----    20-24 Y       ----   < 40       >45     ----      >24 Y       ----   < 40     40-60      >60  .       No results found for: \"LDL\"  Thyroid Stimulating Hormone   Date Value Ref Range Status   01/30/2024 1.21 0.44 - 3.98 mIU/L Final     No results found for: \"A1C\"  No components found for: \"POCA1C\"  No results found for: \"ALBUR\"  No components found for: \"POCALBUR\"    Exercise-walking  Diet-3 meals  Better with eating out; cut out bread  Water, 1 diet dr contreras in am, decaf hot tea    30 units twice a wk semaglutide-compounded    Colon next wk    Very overwhelmed  Grind teeth, muscles tight at night.  Still anxiety; on sertraline  More home chores since 's anxiety is higher. He can't change his work schedule  Pt is sahm    Immunization History   Administered Date(s) Administered    Influenza, Unspecified 01/20/2020    Influenza, injectable, quadrivalent 01/20/2020    Pfizer Purple Cap SARS-CoV-2 03/23/2021, 04/15/2021, 12/03/2021    Pneumococcal polysaccharide vaccine, 23-valent, age 2 years and older (PNEUMOVAX 23) 08/13/2020    Tdap vaccine, age 7 year and older (BOOSTRIX, ADACEL) 05/15/2019        No care team member to display     Review of Systems   Constitutional: Negative.    Respiratory:  Negative for shortness of breath and wheezing.    Cardiovascular:  Negative for chest pain.       Objective   Visit Vitals  BP 99/66   Pulse 81   Temp 36.3 °C (97.3 °F)      BP Readings from Last 3 Encounters:   11/06/24 99/66   09/17/24 97/69   08/06/24 100/69     Wt Readings from Last 3 Encounters:   11/06/24 86.4 kg (190 lb 6.4 oz)   09/17/24 86.3 kg (190 lb 4.8 oz) "   08/06/24 87.5 kg (192 lb 12.8 oz)       The ASCVD Risk score (Frank DK, et al., 2019) failed to calculate for the following reasons:    The 2019 ASCVD risk score is only valid for ages 40 to 79     Physical Exam  Constitutional:       General: She is not in acute distress.     Appearance: Normal appearance.   Neurological:      Mental Status: She is alert.       Assessment/Plan   Diagnoses and all orders for this visit:  MANUEL (generalized anxiety disorder)  Muscle tension pain  -     orphenadrine (Norflex) 100 mg 12 hr tablet; Take 1 tablet (100 mg) by mouth once daily at bedtime. Do not crush, chew, or split.  Other orders  -     Follow Up In Primary Care - Established  -     Flu vaccine, trivalent, preservative free, age 6 months and greater (Fluarix/Fluzone/Flulaval)  -     Follow Up In Primary Care - Established; Future

## 2024-11-06 ENCOUNTER — APPOINTMENT (OUTPATIENT)
Dept: PRIMARY CARE | Facility: CLINIC | Age: 34
End: 2024-11-06
Payer: COMMERCIAL

## 2024-11-06 VITALS
HEIGHT: 65 IN | TEMPERATURE: 97.3 F | SYSTOLIC BLOOD PRESSURE: 99 MMHG | WEIGHT: 190.4 LBS | BODY MASS INDEX: 31.72 KG/M2 | HEART RATE: 81 BPM | OXYGEN SATURATION: 96 % | DIASTOLIC BLOOD PRESSURE: 66 MMHG

## 2024-11-06 DIAGNOSIS — F41.1 GAD (GENERALIZED ANXIETY DISORDER): Primary | ICD-10-CM

## 2024-11-06 DIAGNOSIS — M79.10 MUSCLE TENSION PAIN: ICD-10-CM

## 2024-11-06 LAB — NON-UH HIE CORTISOL - RANDOM: 17.48 UG/DL (ref 3.44–22.45)

## 2024-11-06 PROCEDURE — 90656 IIV3 VACC NO PRSV 0.5 ML IM: CPT | Performed by: NURSE PRACTITIONER

## 2024-11-06 PROCEDURE — 90471 IMMUNIZATION ADMIN: CPT | Performed by: NURSE PRACTITIONER

## 2024-11-06 PROCEDURE — 3008F BODY MASS INDEX DOCD: CPT | Performed by: NURSE PRACTITIONER

## 2024-11-06 PROCEDURE — 1036F TOBACCO NON-USER: CPT | Performed by: NURSE PRACTITIONER

## 2024-11-06 PROCEDURE — 99214 OFFICE O/P EST MOD 30 MIN: CPT | Performed by: NURSE PRACTITIONER

## 2024-11-06 RX ORDER — ORPHENADRINE CITRATE 100 MG/1
100 TABLET, EXTENDED RELEASE ORAL NIGHTLY
Qty: 30 TABLET | Refills: 5 | Status: SHIPPED | OUTPATIENT
Start: 2024-11-06 | End: 2026-04-30

## 2024-11-06 ASSESSMENT — PATIENT HEALTH QUESTIONNAIRE - PHQ9
1. LITTLE INTEREST OR PLEASURE IN DOING THINGS: NOT AT ALL
2. FEELING DOWN, DEPRESSED OR HOPELESS: NOT AT ALL
SUM OF ALL RESPONSES TO PHQ9 QUESTIONS 1 AND 2: 0

## 2024-11-06 NOTE — PATIENT INSTRUCTIONS
Cortisol lab today    Flu shot today    Set up pap with dr arriaza    Orphenadrine-muscle relaxant for bedtime. Not addictive. Not a controlled substance  See therapist    Exercise-cardio 4-5d/wk 30min each day  Diet-Breakfast-toast (my favorite Elva Osuna Delightful multi-grain and Akhil's Killer Bread thin-sliced Good Seed)/bagel/English muffin-whole wheat flour as a 1st ingredient or cereal/oatmeal/granola bar-fiber 4g or more; protein like eggs or peanut butter is Ok  Lunch-protein, veg 1c  Dinner-protein, veg 1c; if having potatoes, rice, noodles, or pasta-->fist sized; could try brown rice or whole wheat pasta or quinoa  Fruit 2 a day  Dairy 2 a day-milk, almond milk, soy milk, yogurt, cottage cheese, yogurt  Snacks-Protein-hard boiled egg, nuts (walnuts/almonds/pecans/pistachios 1/4c), hummus, beef/deer jerky; vegetable, fruit, dairy-milk(1%, skim, almond, soy)/cheese (not a lot of cheddar)/yogurt (Greek is best-my favorite Dannon Fruit on the Bottom Greek)/cottage cheese 2%; triscuits, popcorn have a lot of fiber; serving size  Water  Limit alcohol to 2 drinks per day (1 drink=12oz beer or 5oz wine or 1 1/2oz liquor)  appt in  5  months; be fasting      I will communicate with you via [email or web address] regarding messages and results. If you need help with this, you can call the support line at [phone].    IT WAS A PLEASURE TO SEE YOU TODAY. THANK YOU FOR CHOOSING US FOR YOUR HEALTHCARE NEEDS.

## 2024-11-14 ENCOUNTER — ANESTHESIA (OUTPATIENT)
Dept: GASTROENTEROLOGY | Facility: EXTERNAL LOCATION | Age: 34
End: 2024-11-14

## 2024-11-14 ENCOUNTER — APPOINTMENT (OUTPATIENT)
Dept: GASTROENTEROLOGY | Facility: EXTERNAL LOCATION | Age: 34
End: 2024-11-14
Payer: COMMERCIAL

## 2024-11-14 VITALS
HEART RATE: 76 BPM | BODY MASS INDEX: 31.32 KG/M2 | TEMPERATURE: 97.9 F | HEIGHT: 65 IN | WEIGHT: 188 LBS | OXYGEN SATURATION: 99 % | RESPIRATION RATE: 16 BRPM | DIASTOLIC BLOOD PRESSURE: 73 MMHG | SYSTOLIC BLOOD PRESSURE: 102 MMHG

## 2024-11-14 DIAGNOSIS — K62.5 PAINLESS RECTAL BLEEDING: ICD-10-CM

## 2024-11-14 DIAGNOSIS — R19.8 ALTERNATING CONSTIPATION AND DIARRHEA: ICD-10-CM

## 2024-11-14 DIAGNOSIS — R10.9 ABDOMINAL PAIN, UNSPECIFIED ABDOMINAL LOCATION: Primary | ICD-10-CM

## 2024-11-14 DIAGNOSIS — R10.30 LOWER ABDOMINAL PAIN: ICD-10-CM

## 2024-11-14 DIAGNOSIS — R11.2 NAUSEA AND VOMITING, UNSPECIFIED VOMITING TYPE: ICD-10-CM

## 2024-11-14 DIAGNOSIS — Z80.0 FAMILY HISTORY OF RECTAL CANCER: ICD-10-CM

## 2024-11-14 LAB — PREGNANCY TEST URINE, POC: NEGATIVE

## 2024-11-14 PROCEDURE — 43239 EGD BIOPSY SINGLE/MULTIPLE: CPT | Performed by: STUDENT IN AN ORGANIZED HEALTH CARE EDUCATION/TRAINING PROGRAM

## 2024-11-14 PROCEDURE — 43251 EGD REMOVE LESION SNARE: CPT | Performed by: STUDENT IN AN ORGANIZED HEALTH CARE EDUCATION/TRAINING PROGRAM

## 2024-11-14 PROCEDURE — 88305 TISSUE EXAM BY PATHOLOGIST: CPT | Mod: TC,ELYLAB | Performed by: STUDENT IN AN ORGANIZED HEALTH CARE EDUCATION/TRAINING PROGRAM

## 2024-11-14 PROCEDURE — 45380 COLONOSCOPY AND BIOPSY: CPT | Performed by: STUDENT IN AN ORGANIZED HEALTH CARE EDUCATION/TRAINING PROGRAM

## 2024-11-14 RX ORDER — SODIUM CHLORIDE 9 MG/ML
INJECTION, SOLUTION INTRAVENOUS CONTINUOUS PRN
Status: DISCONTINUED | OUTPATIENT
Start: 2024-11-14 | End: 2024-11-14

## 2024-11-14 RX ORDER — LIDOCAINE HYDROCHLORIDE 20 MG/ML
INJECTION, SOLUTION INFILTRATION; PERINEURAL AS NEEDED
Status: DISCONTINUED | OUTPATIENT
Start: 2024-11-14 | End: 2024-11-14

## 2024-11-14 RX ORDER — PROPOFOL 10 MG/ML
INJECTION, EMULSION INTRAVENOUS AS NEEDED
Status: DISCONTINUED | OUTPATIENT
Start: 2024-11-14 | End: 2024-11-14

## 2024-11-14 SDOH — HEALTH STABILITY: MENTAL HEALTH: CURRENT SMOKER: 0

## 2024-11-14 ASSESSMENT — PAIN - FUNCTIONAL ASSESSMENT
PAIN_FUNCTIONAL_ASSESSMENT: 0-10

## 2024-11-14 ASSESSMENT — PAIN SCALES - GENERAL
PAIN_LEVEL: 0
PAINLEVEL_OUTOF10: 0 - NO PAIN

## 2024-11-14 ASSESSMENT — COLUMBIA-SUICIDE SEVERITY RATING SCALE - C-SSRS
2. HAVE YOU ACTUALLY HAD ANY THOUGHTS OF KILLING YOURSELF?: NO
6. HAVE YOU EVER DONE ANYTHING, STARTED TO DO ANYTHING, OR PREPARED TO DO ANYTHING TO END YOUR LIFE?: NO
1. IN THE PAST MONTH, HAVE YOU WISHED YOU WERE DEAD OR WISHED YOU COULD GO TO SLEEP AND NOT WAKE UP?: NO

## 2024-11-14 NOTE — DISCHARGE INSTRUCTIONS

## 2024-11-14 NOTE — ANESTHESIA PREPROCEDURE EVALUATION
Patient: Mary Carmen Jaeger    Procedure Information       Date/Time: 24 0730    Scheduled providers: Fidel Perez DO    Procedures:       COLONOSCOPY      EGD    Location: [facility]            Relevant Problems   Pulmonary   (+) Exercise-induced asthma (HHS-HCC)   (+) Mild intermittent asthma without complication (HHS-HCC)      Neuro   (+) MANUEL (generalized anxiety disorder)   (+) Panic attacks      Musculoskeletal   (+) Scoliosis      HEENT   (+) Chronic frontal sinusitis       Clinical information reviewed:   Tobacco  Allergies  Meds   Med Hx  Surg Hx  OB Status  Fam Hx  Soc   Hx        NPO Detail:  NPO/Void Status  Date of Last Liquid: 24  Time of Last Liquid: 300  Date of Last Solid: 24  Last Intake Type: Clear fluids         Physical Exam    Airway  Mallampati: II  TM distance: >3 FB  Neck ROM: full     Cardiovascular   Rhythm: regular  Rate: normal     Dental    Pulmonary   Breath sounds clear to auscultation     Abdominal   (+) obese  Abdomen: soft  Bowel sounds: normal         Vitals:    24 0705   BP: 104/71   Pulse: 91   Resp: 12   Temp: 36.7 °C (98.1 °F)   SpO2: 98%       Past Surgical History:   Procedure Laterality Date    KNEE ARTHROSCOPY W/ DEBRIDEMENT  2019    Arthroscopy Knee Right    KNEE ARTHROSCOPY W/ DEBRIDEMENT  2019    Arthroscopy Knee Left    OTHER SURGICAL HISTORY  2022    Corneal lasik    OTHER SURGICAL HISTORY  2022    Back surgery    OTHER SURGICAL HISTORY  2022     section    OTHER SURGICAL HISTORY  2022    Dilation and curettage     Past Medical History:   Diagnosis Date    Acute bacterial sinusitis 2024    Ankle pain 2024    Anxiety     Cough 2024    COVID-19 2022    Diarrhea 2024    Disease due to severe acute respiratory syndrome coronavirus 2 (SARS-CoV-2) 2024    Comment on above: 2022;      Nerve root disorder 2024    Other conditions  influencing health status 08/29/2019    No significant past medical history    Personal history of other complications of pregnancy, childbirth and the puerperium     History of pre-eclampsia    Personal history of other diseases of the respiratory system 03/21/2022    History of acute bacterial sinusitis    Personal history of pulmonary embolism 04/08/2022    History of pulmonary embolism    Premature delivery ([facility]) 09/11/2024    Sprain of ankle 07/03/2023    Unspecified asthma, uncomplicated ([facility])     Mild asthma without complication, unspecified whether persistent    Urinary tract infection 09/11/2024    Viral gastroenteritis 09/11/2024       Current Outpatient Medications:     acetaminophen (Tylenol) 500 mg tablet, Take 1 tablet (500 mg) by mouth if needed., Disp: , Rfl:     albuterol 90 mcg/actuation inhaler, Inhale 2 puffs every 6 hours if needed for wheezing., Disp: , Rfl:     fluticasone propion-salmeteroL (Wixela Inhub) 250-50 mcg/dose diskus inhaler, Inhale 1 puff 2 times a day., Disp: , Rfl:     orphenadrine (Norflex) 100 mg 12 hr tablet, Take 1 tablet (100 mg) by mouth once daily at bedtime. Do not crush, chew, or split., Disp: 30 tablet, Rfl: 5    semaglutide 0.25 mg or 0.5 mg (2 mg/3 mL) pen injector, Inject 0.5 mg under the skin every 7 days., Disp: , Rfl:     sertraline (Zoloft) 100 mg tablet, TAKE ONE TABLET BY MOUTH EVERY DAY, Disp: 90 tablet, Rfl: 2    rizatriptan MLT (Maxalt-MLT) 10 mg disintegrating tablet, Take 1 tablet (10 mg) by mouth 1 time if needed for migraine. May repeat in 2 hours if unresolved. Do not exceed 20 mg in 24 hours., Disp: 12 tablet, Rfl: 5  Prior to Admission medications    Medication Sig Start Date End Date Taking? Authorizing Provider   acetaminophen (Tylenol) 500 mg tablet Take 1 tablet (500 mg) by mouth if needed.   Yes Historical Provider, MD   albuterol 90 mcg/actuation inhaler Inhale 2 puffs every 6 hours if needed for wheezing.   Yes Historical Provider, MD    fluticasone propion-salmeteroL (Wixela Inhub) 250-50 mcg/dose diskus inhaler Inhale 1 puff 2 times a day. 1/26/23  Yes Historical Provider, MD   orphenadrine (Norflex) 100 mg 12 hr tablet Take 1 tablet (100 mg) by mouth once daily at bedtime. Do not crush, chew, or split. 11/6/24 4/30/26 Yes NETO Alvarado   semaglutide 0.25 mg or 0.5 mg (2 mg/3 mL) pen injector Inject 0.5 mg under the skin every 7 days.   Yes Historical Provider, MD   sertraline (Zoloft) 100 mg tablet TAKE ONE TABLET BY MOUTH EVERY DAY 8/16/24  Yes NETO Alvarado   rizatriptan MLT (Maxalt-MLT) 10 mg disintegrating tablet Take 1 tablet (10 mg) by mouth 1 time if needed for migraine. May repeat in 2 hours if unresolved. Do not exceed 20 mg in 24 hours. 8/6/24 10/17/24  NETO Alvarado     Allergies   Allergen Reactions    Codeine Anaphylaxis, Diarrhea, Nausea/vomiting, Nausea Only and Shortness of breath     Social History     Tobacco Use    Smoking status: Never    Smokeless tobacco: Never   Substance Use Topics    Alcohol use: Yes     Comment: social         Chemistry    Lab Results   Component Value Date/Time     01/30/2024 0950    K 4.6 01/30/2024 0950     01/30/2024 0950    CO2 26 01/30/2024 0950    BUN 12 01/30/2024 0950    CREATININE 0.69 01/30/2024 0950    Lab Results   Component Value Date/Time    CALCIUM 9.9 01/30/2024 0950    ALKPHOS 57 01/30/2024 0950    AST 11 01/30/2024 0950    ALT 11 01/30/2024 0950    BILITOT 0.9 01/30/2024 0950          Lab Results   Component Value Date/Time    WBC 5.1 01/30/2024 0950    HGB 14.9 01/30/2024 0950    HCT 43.9 01/30/2024 0950     01/30/2024 0950     Lab Results   Component Value Date/Time    PROTIME 11.3 07/08/2020 1508    INR 1.0 07/08/2020 1508     No results found for this or any previous visit (from the past 4464 hours).      Anesthesia Plan    History of general anesthesia?: yes  History of complications of general anesthesia?:  yes    ASA 2     MAC     The patient is not a current smoker.  Patient was not previously instructed to abstain from smoking on day of procedure.  Education provided regarding risk of obstructive sleep apnea.  intravenous induction   Anesthetic plan and risks discussed with patient.    Plan discussed with CRNA.

## 2024-11-14 NOTE — ANESTHESIA POSTPROCEDURE EVALUATION
Patient: Mary Carmen Jaeger    Procedure Summary       Date: 11/14/24 Room / Location: [facility]    Anesthesia Start: 0729 Anesthesia Stop: 0758    Procedures:       COLONOSCOPY      EGD Diagnosis:       Family history of rectal cancer      Painless rectal bleeding      Lower abdominal pain      Alternating constipation and diarrhea      Nausea and vomiting, unspecified vomiting type      Abdominal pain, unspecified abdominal location    Scheduled Providers: Fidel Perez DO Responsible Provider: DIANNA Velazquez-CRNA    Anesthesia Type: MAC ASA Status: 2            Anesthesia Type: MAC    Vitals Value Taken Time   BP 96/60 11/14/24 0756   Temp 36.6 °C (97.9 °F) 11/14/24 0756   Pulse 89 11/14/24 0756   Resp 17 11/14/24 0756   SpO2 99 % 11/14/24 0756       Anesthesia Post Evaluation    Patient location during evaluation: PACU  Patient participation: complete - patient participated  Level of consciousness: sleepy but conscious  Pain score: 0  Pain management: adequate  Airway patency: patent  Cardiovascular status: acceptable  Respiratory status: acceptable  Hydration status: acceptable  Postoperative Nausea and Vomiting: none        No notable events documented.

## 2024-11-14 NOTE — H&P
Outpatient NR Procedure H&P    Patient Profile  Name Mary Carmen Jaeger  Date of Birth 1990  MRN [id number]  PCP Gloria Barba        Diagnosis: N/V, abdominal pain, rectal bleeding.  Procedure(s):  EGD/Colonoscopy.    Allergies  Allergies   Allergen Reactions    Codeine Anaphylaxis, Diarrhea, Nausea/vomiting, Nausea Only and Shortness of breath       Past Medical History   Past Medical History:   Diagnosis Date    Acute bacterial sinusitis 09/11/2024    Ankle pain 09/11/2024    Anxiety     Cough 09/11/2024    COVID-19 07/2022    Diarrhea 09/11/2024    Disease due to severe acute respiratory syndrome coronavirus 2 (SARS-CoV-2) 09/11/2024    Comment on above: 7/2022;      Nerve root disorder 09/11/2024    Other conditions influencing health status 08/29/2019    No significant past medical history    Personal history of other complications of pregnancy, childbirth and the puerperium     History of pre-eclampsia    Personal history of other diseases of the respiratory system 03/21/2022    History of acute bacterial sinusitis    Personal history of pulmonary embolism 04/08/2022    History of pulmonary embolism    Premature delivery [facility]) 09/11/2024    Sprain of ankle 07/03/2023    Unspecified asthma, uncomplicated ([facility])     Mild asthma without complication, unspecified whether persistent    Urinary tract infection 09/11/2024    Viral gastroenteritis 09/11/2024       Medication Reviewed - yes  Prior to Admission medications    Medication Sig Start Date End Date Taking? Authorizing Provider   acetaminophen (Tylenol) 500 mg tablet Take 1 tablet (500 mg) by mouth if needed.    Historical Provider, MD   albuterol 90 mcg/actuation inhaler Inhale 2 puffs every 6 hours if needed for wheezing.    Historical Provider, MD   fluticasone propion-salmeteroL (Wixela Inhub) 250-50 mcg/dose diskus inhaler Inhale 1 puff 2 times a day. 1/26/23   Historical Provider, MD   orphenadrine (Norflex) 100 mg 12 hr tablet Take 1  tablet (100 mg) by mouth once daily at bedtime. Do not crush, chew, or split. 11/6/24 4/30/26  NETO Alvarado   rizatriptan MLT (Maxalt-MLT) 10 mg disintegrating tablet Take 1 tablet (10 mg) by mouth 1 time if needed for migraine. May repeat in 2 hours if unresolved. Do not exceed 20 mg in 24 hours. 8/6/24 10/17/24  NETO Alvarado   semaglutide 0.25 mg or 0.5 mg (2 mg/3 mL) pen injector Inject 0.5 mg under the skin every 7 days.    Historical Provider, MD   sertraline (Zoloft) 100 mg tablet TAKE ONE TABLET BY MOUTH EVERY DAY 8/16/24   NETO Alvarado       Physical Exam  Vitals:    11/14/24 0705   BP: 104/71   Pulse: 91   Resp: 12   Temp: 36.7 °C (98.1 °F)   SpO2: 98%      Weight   Vitals:    11/14/24 0705   Weight: 85.3 kg (188 lb)     BMI Body mass index is 31.28 kg/m².    General: A&Ox3, NAD.  HEENT: AT/NC.   CV: RRR. No murmur.  Resp: CTA bilaterally. No wheezing, rhonchi or rales.   GI: Soft, NT/ND.   Extrem: No edema. Pulses intact.  Skin: No Jaundice.   Neuro: No focal deficits.   Psych: Normal mood and affect.        Oropharyngeal Classification II (hard and soft palate, upper portion of tonsils and uvula visible)  ASA PS Classification 2  Sedation Plan: Deep Sedation.  Procedure Plan - pre-procedural (re)assesment completed by physician:  discharge/transfer patient when discharge criteria met    Fidel Perez DO  11/14/2024 7:29 AM

## 2024-11-20 LAB
LABORATORY COMMENT REPORT: NORMAL
PATH REPORT.FINAL DX SPEC: NORMAL
PATH REPORT.GROSS SPEC: NORMAL
PATH REPORT.RELEVANT HX SPEC: NORMAL
PATH REPORT.TOTAL CANCER: NORMAL

## 2024-12-03 ENCOUNTER — TELEMEDICINE (OUTPATIENT)
Dept: PRIMARY CARE | Facility: CLINIC | Age: 34
End: 2024-12-03
Payer: COMMERCIAL

## 2024-12-03 ENCOUNTER — APPOINTMENT (OUTPATIENT)
Dept: PRIMARY CARE | Facility: CLINIC | Age: 34
End: 2024-12-03
Payer: COMMERCIAL

## 2024-12-03 DIAGNOSIS — J01.90 ACUTE NON-RECURRENT SINUSITIS, UNSPECIFIED LOCATION: Primary | ICD-10-CM

## 2024-12-03 PROCEDURE — 99214 OFFICE O/P EST MOD 30 MIN: CPT | Performed by: FAMILY MEDICINE

## 2024-12-03 RX ORDER — AMOXICILLIN AND CLAVULANATE POTASSIUM 875; 125 MG/1; MG/1
875 TABLET, FILM COATED ORAL 2 TIMES DAILY
Qty: 14 TABLET | Refills: 0 | Status: SHIPPED | OUTPATIENT
Start: 2024-12-03 | End: 2024-12-10

## 2024-12-03 NOTE — PROGRESS NOTES
I performed this visit using realtime telehealth tools, including an audio/video OR telephone connection between the patient listed who was located in the [facility] and myself, Gloria Chapman (Board certified in the [facility]).  At the start of the visit, I introduced myself as Dr. Hdez and verified the patients name, , and current physical location.    If they were currently outside of the state of [place], the visit was ended and the patient was referred to alternative means for evaluation and treatment.   The patient was made aware of the limitations of the telehealth visit.  They will not be physically examined and all issues may not be appropriate for a telehealth visit.  If necessary, an in person referral will be made.      DISCLAIMER:   In preparing for this visit and writing this note, I reviewed previous electronic medical records (labs, imaging and medical charts) of the patient available in the physician portal. Significant findings which helped in decision making are recorded in this encounter charting.    All allergies were reviewed with the patient and all medications reconciled with the patient.     started with sinus sxs  Has not resolved  Thought around thanksgiving it was getting better but this weekend sinuses worse again  PND   Cough ribs hurt-- cough from drainage  No fever, chills, aches  Achy from coughing  Every am coughing up thick green mucous--  Mornings are bad--a little better in day then worse as evening comes  No sob or wheezing or chest tightness-not in the lungs  No affecting asthma at all    All other ROS (-)    Alert in NAD  Eyes clear  No resp distress   No coughing  Congested sounding  Affect wnl    Sinusitis  Augmentin as written    At the completion of the visit, possible diagnoses and plan was discussed with the patient as well as recommended treatments, medications prescribed, and when to follow up for in person evaluation. All questions were answered and the  patient verbalized understanding.  Limitations to telemedicine include inability to do a complete and accurate physical exam.  Any concerns regarding this were conveyed with the patient and in person follow-up recommended if patient nature of illness does not progress as anticipated during this visit.

## 2024-12-03 NOTE — PATIENT INSTRUCTIONS
"Augmentin as written    Please send me a MyChart message if you have any questions or concerns.  FOR NON URGENT questions only.  Allow up to 72 hours for response.    If you have prescription issues or other questions you can email   Satya Masterson [facility] Health Coordinator, at   [email or web address]     Rest and drink plenty of fluids    Tylenol and or motrin as needed for pain and fever (unless you have been told not to take these because of your personal medical history)    Discussed options and precautions (complaint specific and may include)  Viral versus bacterial infection; use of medications; possible side effects; appropriate over-the-counter medications; possible complications and /or when to follow-up.    if sent for in person care at  or ED,  it was explained why and failure to have \"higher level of care\" further evaluation, and treatment today may lead, but is not limited to negative outcomes, permanent disability, or even death.     All red flags requiring in person care were discussed.    If not sent for in person care today, follow-up with your PCP in 2-3 days, sooner if not  improving.    Follow-up immediately if symptoms worsen. If experiencing symptoms including but not limited to lethargy / chest pain / weakness / dizziness / difficulty breathing please call 911 or go to the emergency department for immediate care.     All patient's questions were answered. Patient has good decision making capacity.  They are alert to person, place, time and situation.  Patient has the ability to communicate choice, understand information, consequences, and reason rationally.      ____________________________________________________________________________________________________________  To connect with a new PCP please visit [email or web address] or call [phone]                            "

## 2024-12-05 NOTE — PROGRESS NOTES
"Subjective   Patient ID: Mary Carmen Jaeger is a 34 y.o. female who presents for Follow-up.  [facility]  LOV 9/17/24:  34-year-old female with a constellation of GI complaints that wax and wane.  Patient is adamant on endoscopy for fear of CRC.  Maternal grandmother diagnosed at 56 years old.  Patient likely with internal hemorrhoids.  However, cannot rule out bleeding polyp/neoplasm or IBD.  Recommend colonoscopy to rule out organic etiology to her complaints.  Recommend extended prep.  Patient to hold semaglutide 1 week prior to her colonoscopy.    Nausea and vomiting, unspecified vomiting type  Patient with flareups of nausea/vomiting, abdominal pain and diarrhea.  The symptoms wax and wane.  No previous EGD.  Recommend upper endoscopy to rule out gastritis, PUD, duodenitis.  If EGD returns unremarkable recommend workup for GB disease     ->EGD 11/14/24: Pathology of resected esophageal polyp proved to be a wart, benign.  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  Small hiatal hernia (Hill grade II).   The stomach appeared normal s/p biopsies.  The duodenum appeared normal s/p biopsies.  ->Colonoscopy 11/14/24:  The terminal ileum appeared normal.  Normal mucosa throughout the colon s/p biopsies.  Hemorrhoids.     [facility] Follow up 12/6/24:  Patient following up after undergoing EGD and colonoscopy she has been using MiraLAX regularly with good response to alternating constipation and diarrhea.  No abdominal pain.  No melena or hematochezia.  Weight and appetite are stable.  Occasional episodes of \"upset stomach\" followed by nausea and loose stools.  This only happens on an intermittent basis.      Virtual or Telephone Consent    An interactive audio and video telecommunication system which permits real time communications between the patient (at the originating site) and provider (at the distant site) was utilized to provide this telehealth service.   Verbal consent was requested and obtained from " Mary Carmenkimmie Jaeger on this date, 12/06/24 for a telehealth visit.    Review of Systems   All other systems reviewed and are negative.      Objective   Physical Exam  Constitutional:       General: She is awake. She is not in acute distress.     Appearance: Normal appearance. She is well-developed. She is not ill-appearing, toxic-appearing or diaphoretic.   HENT:      Head: Normocephalic and atraumatic.   Eyes:      General: No scleral icterus.     Pupils: Pupils are equal, round, and reactive to light.   Pulmonary:      Effort: Pulmonary effort is normal. No respiratory distress.   Musculoskeletal:         General: Normal range of motion.   Skin:     Coloration: Skin is not cyanotic, jaundiced or pale.   Neurological:      General: No focal deficit present.      Mental Status: She is alert and oriented to person, place, and time.   Psychiatric:         Behavior: Behavior is cooperative.         Assessment/Plan   Diagnoses and all orders for this visit:  Very pleasant 34-year-old female status post EGD and colonoscopy detailed in the HPI.  Both procedure results were reviewed with the patient, all questions answered.  Her symptoms have improved with a daily bowel regimen.  However, she will occasionally experience nausea with indigestion.  No red flag symptoms noted.  Recommend trialing Pepcid twice daily as needed.  She will update me on her response.  If she has a suboptimal response to this we can use Zofran as needed.  Follow-up in 1 year, sooner if needed.  Alternating constipation and diarrhea  Nausea and vomiting, unspecified vomiting type  -     famotidine (Pepcid) 40 mg tablet; Take 1 tablet (40 mg) by mouth 2 times a day as needed for heartburn.           NETO Quezada 12/06/24 10:02 AM

## 2024-12-06 ENCOUNTER — APPOINTMENT (OUTPATIENT)
Dept: GASTROENTEROLOGY | Facility: CLINIC | Age: 34
End: 2024-12-06
Payer: COMMERCIAL

## 2024-12-06 DIAGNOSIS — R19.8 ALTERNATING CONSTIPATION AND DIARRHEA: ICD-10-CM

## 2024-12-06 DIAGNOSIS — R11.2 NAUSEA AND VOMITING, UNSPECIFIED VOMITING TYPE: ICD-10-CM

## 2024-12-06 DIAGNOSIS — Z80.0 FAMILY HISTORY OF RECTAL CANCER: Primary | ICD-10-CM

## 2024-12-06 PROCEDURE — 99213 OFFICE O/P EST LOW 20 MIN: CPT | Performed by: NURSE PRACTITIONER

## 2024-12-06 RX ORDER — FAMOTIDINE 40 MG/1
40 TABLET, FILM COATED ORAL 2 TIMES DAILY PRN
Qty: 60 TABLET | Refills: 11 | Status: SHIPPED | OUTPATIENT
Start: 2024-12-06 | End: 2025-12-06

## 2025-01-23 ENCOUNTER — TELEMEDICINE (OUTPATIENT)
Dept: PRIMARY CARE | Facility: CLINIC | Age: 35
End: 2025-01-23
Payer: COMMERCIAL

## 2025-01-23 DIAGNOSIS — J01.90 ACUTE NON-RECURRENT SINUSITIS, UNSPECIFIED LOCATION: Primary | ICD-10-CM

## 2025-01-23 PROCEDURE — 99214 OFFICE O/P EST MOD 30 MIN: CPT | Performed by: FAMILY MEDICINE

## 2025-01-23 RX ORDER — AMOXICILLIN AND CLAVULANATE POTASSIUM 875; 125 MG/1; MG/1
1 TABLET, FILM COATED ORAL 2 TIMES DAILY
Qty: 14 TABLET | Refills: 0 | Status: SHIPPED | OUTPATIENT
Start: 2025-01-23 | End: 2025-01-30

## 2025-01-23 RX ORDER — BENZONATATE 200 MG/1
200 CAPSULE ORAL 3 TIMES DAILY PRN
Qty: 42 CAPSULE | Refills: 0 | Status: SHIPPED | OUTPATIENT
Start: 2025-01-23 | End: 2025-02-22

## 2025-01-23 NOTE — PROGRESS NOTES
I performed this visit using real-time telehealth tools, including an audio/video OR telephone connection between the patient listed who was located in the [place] and myself, Gloria Chapman (Board certified in the [facility]).At the start of the visit, I introduced myself as Dr. Hdez and verified the patients name, , and current physical location.  If they were currently outside of the state of [place], the visit was ended and the patient was referred to alternative means for evaluation and treatment.  The patient was made aware of the limitations of the telehealth visit.  They will not be physically examined and all issues may not be appropriate for a telehealth visit.  If necessary, an in-    In preparing for this visit and writing this note, I reviewed previous electronic medical records (labs, imaging and medical charts) of the patient available in the physician portal. Significant findings which helped in decision making are recorded in this encounter charting.  All allergies were reviewed with the patient and all medications reconciled verbally with the patient at the beginning oft the visit.  ____________________________________________________________________________________________  Chief complaint:     Sick since beginning of January  First week had no voice  Initially chills and bad body aches and bad congestion no fever myalgias   In bed first 2 days  Then cough got bad  Thought getting better after a week or so  2 days later back to green mucous and cough-  Coughs all night long  Using inhaler 2x a day for past week-- helps   Usually goes months without using  Headaches from sinus congestion  A lot of PND--   Covid (-)   All other ROS (-)    General appearance:  Vitals available from patient? no  Alert, oriented, pleasant, in no apparent distress? yes  Answers questions appropriately? yes  Eyes clear? yes  Is patient in respiratory distress? no  Throat exam: not available  Is patient  coughing during consult: no  Audible wheezing noted? : no  Pt sounds congested?:  no  Sniffing or rhinorrhea?:  no  Frontal sinus TTP by palpation?  yes  Maxillary sinus TTP by palpation? NO  Tender neck LAD by pt exam?: no  Psychiatric: Affect normal? yes  Other relevant physical exam:    Assessment and Plan:    Sinusitis-- sinus sxs for at least 7 days and getting worse or 10 days with no improvements  Augmentin 875mg twice a day for 7 days  Benzonatate for cough prn  _____________________________________________________________________________________________    Discussed with patient during visit  differential diagnosis; viral versus bacterial infection;  (if relevant); use of medications prescribed and possible side effects; appropriate over-the-counter medications; possible complications ; when to follow-up for in person evaluation.  All patient's questions were answered. Patient has good decision making capacity.  They are alert to person, place, time and situation.  Patient has the ability to communicate choice, understand information, consequences, and reason rationally.  If sent for in person care at  or ED,    I explained why Urgent in person exam was necessary and that failure to have further evaluation, and treatment today may lead to, negative outcomes, permanent disability, or even death.   If not sent for in person care today,   follow-up with your PCP in 2-3 days, sooner if not  improving.    Follow-up immediately if symptoms worsen.   If experiencing symptoms including but not limited to lethargy / chest pain / weakness / dizziness / difficulty breathing please call 911 or go to the closest emergency department for immediate care.   Limitations to telemedicine include inability to do a complete and accurate physical exam.    Any concerns regarding this were conveyed with the patient and in person follow-up recommended if the nature of their concern/illness does not progress as anticipated during  this visit.

## 2025-01-23 NOTE — PATIENT INSTRUCTIONS
"Sinusitis-- sinus sxs for at least 7 days and getting worse or 10 days with no improvements  Augmentin 875mg twice a day for 7 days  Benzonatate for cough prn    Please send me a MyChart message if you have any questions or concerns.  FOR NON URGENT questions only.  Allow up to 72 hours for response.    If you have prescription issues or other questions you can email   Satya Masterson [facility] Health Coordinator, at   [email or web address]     Rest and drink plenty of fluids    Tylenol and or motrin as needed for pain and fever (unless you have been told not to take these because of your personal medical history)    Discussed options and precautions (complaint specific and may include)  Viral versus bacterial infection; use of medications; possible side effects; appropriate over-the-counter medications; possible complications and /or when to follow-up.    if sent for in person care at  or ED,  it was explained why and failure to have \"higher level of care\" further evaluation, and treatment today may lead, but is not limited to negative outcomes, permanent disability, or even death.     All red flags requiring in person care were discussed.    If not sent for in person care today, follow-up with your PCP in 2-3 days, sooner if not  improving.    Follow-up immediately if symptoms worsen. If experiencing symptoms including but not limited to lethargy / chest pain / weakness / dizziness / difficulty breathing please call 911 or go to the emergency department for immediate care.     All patient's questions were answered. Patient has good decision making capacity.  They are alert to person, place, time and situation.  Patient has the ability to communicate choice, understand information, consequences, and reason rationally.      ____________________________________________________________________________________________________________  To connect with a new PCP please visit " [email or web address] or call [phone]

## 2025-03-20 ENCOUNTER — PATIENT MESSAGE (OUTPATIENT)
Dept: PRIMARY CARE | Facility: CLINIC | Age: 35
End: 2025-03-20
Payer: COMMERCIAL

## 2025-03-20 DIAGNOSIS — G43.109 OCULAR MIGRAINE: ICD-10-CM

## 2025-03-20 DIAGNOSIS — R11.2 NAUSEA AND VOMITING, UNSPECIFIED VOMITING TYPE: Primary | ICD-10-CM

## 2025-03-20 RX ORDER — ONDANSETRON 4 MG/1
4 TABLET, FILM COATED ORAL EVERY 8 HOURS PRN
Qty: 30 TABLET | Refills: 5 | Status: SHIPPED | OUTPATIENT
Start: 2025-03-20 | End: 2025-05-19

## 2025-03-20 RX ORDER — RIZATRIPTAN BENZOATE 10 MG/1
10 TABLET, ORALLY DISINTEGRATING ORAL ONCE AS NEEDED
Qty: 12 TABLET | Refills: 5 | Status: SHIPPED | OUTPATIENT
Start: 2025-03-20 | End: 2025-05-31

## 2025-04-07 ENCOUNTER — APPOINTMENT (OUTPATIENT)
Dept: PRIMARY CARE | Facility: CLINIC | Age: 35
End: 2025-04-07
Payer: COMMERCIAL

## 2025-08-09 DIAGNOSIS — F41.1 GAD (GENERALIZED ANXIETY DISORDER): ICD-10-CM

## 2025-08-11 ENCOUNTER — TELEPHONE (OUTPATIENT)
Dept: PRIMARY CARE | Facility: CLINIC | Age: 35
End: 2025-08-11
Payer: COMMERCIAL

## 2025-08-11 RX ORDER — SERTRALINE HYDROCHLORIDE 100 MG/1
100 TABLET, FILM COATED ORAL DAILY
Qty: 90 TABLET | Refills: 0 | Status: SHIPPED | OUTPATIENT
Start: 2025-08-11

## 2025-08-27 ASSESSMENT — ENCOUNTER SYMPTOMS
BACK PAIN: 0
EYE PAIN: 0
WOUND: 0
POLYPHAGIA: 0
APPETITE CHANGE: 0
CONFUSION: 0
VOMITING: 0
ADENOPATHY: 0
HEMATURIA: 0
TROUBLE SWALLOWING: 0
BRUISES/BLEEDS EASILY: 0
SHORTNESS OF BREATH: 0
PALPITATIONS: 0
HEADACHES: 0
ABDOMINAL PAIN: 0
UNEXPECTED WEIGHT CHANGE: 0
NECK PAIN: 0
EYE REDNESS: 0
FREQUENCY: 0
COUGH: 0
CHILLS: 0
EYE DISCHARGE: 0
DYSURIA: 0
FEVER: 0
BLOOD IN STOOL: 0
FATIGUE: 0
POLYDIPSIA: 0
SORE THROAT: 0
HALLUCINATIONS: 0
DIZZINESS: 0

## 2025-08-28 ENCOUNTER — OFFICE VISIT (OUTPATIENT)
Dept: PRIMARY CARE | Facility: CLINIC | Age: 35
End: 2025-08-28
Payer: COMMERCIAL

## 2025-08-28 ENCOUNTER — APPOINTMENT (OUTPATIENT)
Dept: PRIMARY CARE | Facility: CLINIC | Age: 35
End: 2025-08-28
Payer: COMMERCIAL

## 2025-08-28 VITALS
OXYGEN SATURATION: 96 % | HEART RATE: 83 BPM | SYSTOLIC BLOOD PRESSURE: 104 MMHG | DIASTOLIC BLOOD PRESSURE: 70 MMHG | WEIGHT: 196.8 LBS | BODY MASS INDEX: 32.79 KG/M2 | HEIGHT: 65 IN | TEMPERATURE: 97.5 F

## 2025-08-28 DIAGNOSIS — G89.29 CHRONIC NECK PAIN: ICD-10-CM

## 2025-08-28 DIAGNOSIS — M54.2 CHRONIC NECK PAIN: ICD-10-CM

## 2025-08-28 DIAGNOSIS — Z00.00 ROUTINE GENERAL MEDICAL EXAMINATION AT A HEALTH CARE FACILITY: Primary | ICD-10-CM

## 2025-08-28 DIAGNOSIS — F41.1 GAD (GENERALIZED ANXIETY DISORDER): ICD-10-CM

## 2025-08-28 DIAGNOSIS — G89.29 CHRONIC BILATERAL LOW BACK PAIN WITHOUT SCIATICA: ICD-10-CM

## 2025-08-28 DIAGNOSIS — M54.50 CHRONIC BILATERAL LOW BACK PAIN WITHOUT SCIATICA: ICD-10-CM

## 2025-08-28 DIAGNOSIS — M79.10 MUSCLE TENSION PAIN: ICD-10-CM

## 2025-08-28 PROCEDURE — 1036F TOBACCO NON-USER: CPT | Performed by: NURSE PRACTITIONER

## 2025-08-28 PROCEDURE — 3008F BODY MASS INDEX DOCD: CPT | Performed by: NURSE PRACTITIONER

## 2025-08-28 PROCEDURE — 99395 PREV VISIT EST AGE 18-39: CPT | Performed by: NURSE PRACTITIONER

## 2025-08-28 PROCEDURE — 90471 IMMUNIZATION ADMIN: CPT | Performed by: NURSE PRACTITIONER

## 2025-08-28 PROCEDURE — 90656 IIV3 VACC NO PRSV 0.5 ML IM: CPT | Performed by: NURSE PRACTITIONER

## 2025-08-28 RX ORDER — ORPHENADRINE CITRATE 100 MG/1
100 TABLET, EXTENDED RELEASE ORAL NIGHTLY
Qty: 30 TABLET | Refills: 5 | Status: SHIPPED | OUTPATIENT
Start: 2025-08-28 | End: 2027-02-19

## 2025-08-28 RX ORDER — SERTRALINE HYDROCHLORIDE 100 MG/1
100 TABLET, FILM COATED ORAL DAILY
Qty: 90 TABLET | Refills: 4 | Status: SHIPPED | OUTPATIENT
Start: 2025-08-28

## 2025-08-28 ASSESSMENT — ENCOUNTER SYMPTOMS
EYE DISCHARGE: 0
UNEXPECTED WEIGHT CHANGE: 0
ADENOPATHY: 0
COUGH: 0
EYE PAIN: 0
POLYPHAGIA: 0
FEVER: 0
CHILLS: 0
VOMITING: 0
EYE REDNESS: 0
DIZZINESS: 0
FATIGUE: 0
BRUISES/BLEEDS EASILY: 0
SHORTNESS OF BREATH: 0
ABDOMINAL PAIN: 0
SORE THROAT: 0
CONFUSION: 0
BLOOD IN STOOL: 0
APPETITE CHANGE: 0
HALLUCINATIONS: 0
PALPITATIONS: 0
WOUND: 0
POLYDIPSIA: 0
TROUBLE SWALLOWING: 0
BACK PAIN: 0
DYSURIA: 0
FREQUENCY: 0
HEMATURIA: 0
NECK PAIN: 0
HEADACHES: 0

## 2025-08-28 ASSESSMENT — PROMIS GLOBAL HEALTH SCALE
CARRYOUT_PHYSICAL_ACTIVITIES: COMPLETELY
RATE_MENTAL_HEALTH: VERY GOOD
RATE_AVERAGE_FATIGUE: MILD
RATE_PHYSICAL_HEALTH: VERY GOOD
RATE_QUALITY_OF_LIFE: VERY GOOD
RATE_GENERAL_HEALTH: VERY GOOD
RATE_AVERAGE_PAIN: 6
EMOTIONAL_PROBLEMS: NEVER
RATE_SOCIAL_SATISFACTION: EXCELLENT
CARRYOUT_SOCIAL_ACTIVITIES: EXCELLENT

## 2026-08-31 ENCOUNTER — APPOINTMENT (OUTPATIENT)
Dept: PRIMARY CARE | Facility: CLINIC | Age: 36
End: 2026-08-31
Payer: COMMERCIAL